# Patient Record
Sex: FEMALE | Race: BLACK OR AFRICAN AMERICAN | Employment: FULL TIME | URBAN - METROPOLITAN AREA
[De-identification: names, ages, dates, MRNs, and addresses within clinical notes are randomized per-mention and may not be internally consistent; named-entity substitution may affect disease eponyms.]

---

## 2017-06-19 ENCOUNTER — HOSPITAL ENCOUNTER (OUTPATIENT)
Dept: MAMMOGRAPHY | Age: 47
Discharge: HOME | End: 2017-06-19
Attending: NURSE PRACTITIONER

## 2017-06-19 DIAGNOSIS — Z12.31 VISIT FOR SCREENING MAMMOGRAM: ICD-10-CM

## 2017-06-19 PROCEDURE — 77067 SCR MAMMO BI INCL CAD: CPT

## 2018-03-22 ENCOUNTER — TELEPHONE (OUTPATIENT)
Dept: SLEEP MEDICINE | Age: 48
End: 2018-03-22

## 2018-03-22 DIAGNOSIS — G47.33 OSA (OBSTRUCTIVE SLEEP APNEA): Primary | ICD-10-CM

## 2018-03-22 NOTE — TELEPHONE ENCOUNTER
STUDY ORDER CONFIRMATION    Study Indication:   G47.50  Study: Split Night Polysomnogram - CPT 91830: First prtion of the study is diagnostic, CPAP, BiPAPor ASV (as warranted) is titrated if HOLLY meets criteria. Schedule for second night study if split night was not possible, or optimal pressure could not be determined during the intital study.   Study Instructions / additional orders:

## 2018-03-22 NOTE — TELEPHONE ENCOUNTER
STUDY ORDER CONFIRMATION  Abiel Serrano 1970      Type of Study Requested: Direct Referral for Study - Please arrange a sleep study consult only if sleep study is positive. Diagnostic polysomnogram - CPT 18421: Split Study if patient meets the criteria. Referring Physician: Italia Mcknight MD    Date of Study: 04/19/2018    Spoke with: Michelle Rader         Height: 5ft4  Weight: 260  (over 499 lb can only be done at [facility])  BMI: 44.6       Snoring      yes    Excessive Daytime Sleepiness   yes    Witnessed Apnea     yes    Hypertension      yes    Cardiovascular disease (CHF-Heart Failure)  no    COPD or Emphysema?    no  On Oxygen? lpm Day/Night   no    Restless Leg Symptoms-   Do you experience an uncomfortable sensation in your legs   especially at night?    no   Kicking?     no   Twitching?     no    Do you experience insomnia?   no  Sleep talking/walking?     no  Do you ever wake with a rapid heart rate?  no  Unusual movements in sleep (violent, flailing limbs? )no  Night Terrors?      no  Sleep Paralysis or Sleep Hallucinations:  (while waking from sleep or dream, you cannot move) no  Do you/have you had seizures?   no  Have you had a Stroke, CVA or TIA?   no       When? Do you take any medications for the following? Pain       no  Anxiety       no  Sleep       no               Have you been in hospital?    no   Has it been at least 72 hrs. since discharge? no   Discharge Date   (If not at least 72 hrs, cannot see pt. for study)    Are you currently on an antibiotic?   no  If yes, for what reason? Do you need anything from us for your employer? no    Are you a CDL, DOT or other Certified ? no     Have you had a sleep study before?   yes  If yes, when and where? [place]   Are you currently using CPAP?   no  If yes, what is the setting? Do you have any special needs?   Wheelchair      no  Help to and from the bathroom   no  Other?      no    (If yes to any special needs a care giver may need to come with the patient and stay the night.)    Will someone need to accompany you on the night of your study? (Primarily for parents of minors, patients with special needs, elderly, long distance travel). Other family,  may stay until study begins. \"We want to be sure to take the best care of you. Are there Cultural or Spiritual needs that we should be aware of or are there any concerns that I can address for you?        no    If yes, describe:      Attach Medication List    If yes to any  or special needs, discuss with the Lead Tech    Notes:     Study date: 4/19/2018  Time:9:30PM  Location:[facility]       Compiled by:  Jony Marshall     Date: 3/22/2018

## 2018-04-17 ENCOUNTER — HOSPITAL ENCOUNTER (OUTPATIENT)
Dept: SLEEP MEDICINE | Age: 48
Discharge: HOME | End: 2018-04-17
Attending: INTERNAL MEDICINE
Payer: SUBSIDIZED

## 2018-04-17 VITALS
WEIGHT: 261.1 LBS | OXYGEN SATURATION: 98 % | HEART RATE: 100 BPM | TEMPERATURE: 97.9 F | SYSTOLIC BLOOD PRESSURE: 128 MMHG | HEIGHT: 64 IN | BODY MASS INDEX: 44.57 KG/M2 | DIASTOLIC BLOOD PRESSURE: 82 MMHG

## 2018-04-17 DIAGNOSIS — G47.33 OSA (OBSTRUCTIVE SLEEP APNEA): ICD-10-CM

## 2018-04-17 PROCEDURE — 95811 POLYSOM 6/>YRS CPAP 4/> PARM: CPT | Performed by: INTERNAL MEDICINE

## 2018-04-18 ENCOUNTER — TELEPHONE (OUTPATIENT)
Dept: SLEEP MEDICINE | Age: 48
End: 2018-04-18

## 2018-04-20 ENCOUNTER — DOCUMENTATION ONLY (OUTPATIENT)
Dept: SLEEP MEDICINE | Age: 48
End: 2018-04-20

## 2019-02-18 ENCOUNTER — APPOINTMENT (OUTPATIENT)
Dept: GENERAL RADIOLOGY | Age: 49
End: 2019-02-18
Attending: EMERGENCY MEDICINE
Payer: SELF-PAY

## 2019-02-18 ENCOUNTER — HOSPITAL ENCOUNTER (OUTPATIENT)
Age: 49
Setting detail: OBSERVATION
Discharge: HOME | End: 2019-02-20
Attending: EMERGENCY MEDICINE | Admitting: HOSPITALIST
Payer: SELF-PAY

## 2019-02-18 DIAGNOSIS — J45.51 SEVERE PERSISTENT ASTHMA WITH ACUTE EXACERBATION: Primary | ICD-10-CM

## 2019-02-18 PROBLEM — J45.901 ASTHMA EXACERBATION: Status: ACTIVE | Noted: 2019-02-18

## 2019-02-18 LAB
BASOPHILS # BLD: 0 K/UL (ref 0–0.1)
BASOPHILS NFR BLD: 0 % (ref 0–1)
DIFFERENTIAL METHOD BLD: ABNORMAL
EOSINOPHIL # BLD: 0.1 K/UL (ref 0–0.4)
EOSINOPHIL NFR BLD: 2 % (ref 0–7)
ERYTHROCYTE [DISTWIDTH] IN BLOOD BY AUTOMATED COUNT: 14.5 % (ref 11.5–14.5)
GLUCOSE BLD STRIP.AUTO-MCNC: 302 MG/DL (ref 65–100)
GLUCOSE BLD STRIP.AUTO-MCNC: 459 MG/DL (ref 65–100)
HCT VFR BLD AUTO: 38 % (ref 35–47)
HGB BLD-MCNC: 12.2 G/DL (ref 11.5–16)
IMM GRANULOCYTES # BLD AUTO: 0 K/UL (ref 0–0.04)
IMM GRANULOCYTES NFR BLD AUTO: 1 % (ref 0–0.5)
LACTATE BLD-SCNC: 1.81 MMOL/L (ref 0.4–2)
LYMPHOCYTES # BLD: 2.3 K/UL (ref 0.8–3.5)
LYMPHOCYTES NFR BLD: 28 % (ref 12–49)
MCH RBC QN AUTO: 26.2 PG (ref 26–34)
MCHC RBC AUTO-ENTMCNC: 32.1 G/DL (ref 30–36.5)
MCV RBC AUTO: 81.5 FL (ref 80–99)
MONOCYTES # BLD: 0.6 K/UL (ref 0–1)
MONOCYTES NFR BLD: 7 % (ref 5–13)
NEUTS SEG # BLD: 5 K/UL (ref 1.8–8)
NEUTS SEG NFR BLD: 62 % (ref 32–75)
NRBC # BLD: 0 K/UL (ref 0–0.01)
NRBC BLD-RTO: 0 PER 100 WBC
PLATELET # BLD AUTO: 302 K/UL (ref 150–400)
PMV BLD AUTO: 10.8 FL (ref 8.9–12.9)
RBC # BLD AUTO: 4.66 M/UL (ref 3.8–5.2)
SERVICE CMNT-IMP: ABNORMAL
SERVICE CMNT-IMP: ABNORMAL
TROPONIN I BLD-MCNC: <0.04 NG/ML (ref 0–0.08)
WBC # BLD AUTO: 8.1 K/UL (ref 3.6–11)

## 2019-02-18 PROCEDURE — 83605 ASSAY OF LACTIC ACID: CPT

## 2019-02-18 PROCEDURE — 99285 EMERGENCY DEPT VISIT HI MDM: CPT

## 2019-02-18 PROCEDURE — 96376 TX/PRO/DX INJ SAME DRUG ADON: CPT

## 2019-02-18 PROCEDURE — 74011250636 HC RX REV CODE- 250/636: Performed by: HOSPITALIST

## 2019-02-18 PROCEDURE — 71046 X-RAY EXAM CHEST 2 VIEWS: CPT

## 2019-02-18 PROCEDURE — 94640 AIRWAY INHALATION TREATMENT: CPT

## 2019-02-18 PROCEDURE — 84484 ASSAY OF TROPONIN QUANT: CPT

## 2019-02-18 PROCEDURE — 96375 TX/PRO/DX INJ NEW DRUG ADDON: CPT

## 2019-02-18 PROCEDURE — 74011000250 HC RX REV CODE- 250: Performed by: EMERGENCY MEDICINE

## 2019-02-18 PROCEDURE — 74011000250 HC RX REV CODE- 250

## 2019-02-18 PROCEDURE — 99218 HC RM OBSERVATION: CPT

## 2019-02-18 PROCEDURE — 85025 COMPLETE CBC W/AUTO DIFF WBC: CPT

## 2019-02-18 PROCEDURE — 96361 HYDRATE IV INFUSION ADD-ON: CPT

## 2019-02-18 PROCEDURE — 93005 ELECTROCARDIOGRAM TRACING: CPT

## 2019-02-18 PROCEDURE — 36415 COLL VENOUS BLD VENIPUNCTURE: CPT

## 2019-02-18 PROCEDURE — 82962 GLUCOSE BLOOD TEST: CPT

## 2019-02-18 PROCEDURE — 96365 THER/PROPH/DIAG IV INF INIT: CPT

## 2019-02-18 PROCEDURE — 74011250636 HC RX REV CODE- 250/636: Performed by: EMERGENCY MEDICINE

## 2019-02-18 PROCEDURE — 96372 THER/PROPH/DIAG INJ SC/IM: CPT

## 2019-02-18 PROCEDURE — 74011250637 HC RX REV CODE- 250/637: Performed by: EMERGENCY MEDICINE

## 2019-02-18 PROCEDURE — 74011636637 HC RX REV CODE- 636/637: Performed by: HOSPITALIST

## 2019-02-18 RX ORDER — SODIUM CHLORIDE 0.9 % (FLUSH) 0.9 %
5-40 SYRINGE (ML) INJECTION EVERY 8 HOURS
Status: DISCONTINUED | OUTPATIENT
Start: 2019-02-18 | End: 2019-02-20 | Stop reason: HOSPADM

## 2019-02-18 RX ORDER — IPRATROPIUM BROMIDE AND ALBUTEROL SULFATE 2.5; .5 MG/3ML; MG/3ML
3 SOLUTION RESPIRATORY (INHALATION)
Status: COMPLETED | OUTPATIENT
Start: 2019-02-18 | End: 2019-02-18

## 2019-02-18 RX ORDER — KETOROLAC TROMETHAMINE 30 MG/ML
15 INJECTION, SOLUTION INTRAMUSCULAR; INTRAVENOUS
Status: COMPLETED | OUTPATIENT
Start: 2019-02-18 | End: 2019-02-18

## 2019-02-18 RX ORDER — KETOROLAC TROMETHAMINE 30 MG/ML
INJECTION, SOLUTION INTRAMUSCULAR; INTRAVENOUS
Status: DISPENSED
Start: 2019-02-18 | End: 2019-02-19

## 2019-02-18 RX ORDER — LEVOTHYROXINE SODIUM 25 UG/1
25 TABLET ORAL
Status: DISCONTINUED | OUTPATIENT
Start: 2019-02-19 | End: 2019-02-20 | Stop reason: HOSPADM

## 2019-02-18 RX ORDER — PANTOPRAZOLE SODIUM 40 MG/1
40 TABLET, DELAYED RELEASE ORAL DAILY
Status: DISCONTINUED | OUTPATIENT
Start: 2019-02-19 | End: 2019-02-20 | Stop reason: HOSPADM

## 2019-02-18 RX ORDER — METFORMIN HYDROCHLORIDE 500 MG/1
1000 TABLET ORAL 2 TIMES DAILY WITH MEALS
Status: DISCONTINUED | OUTPATIENT
Start: 2019-02-19 | End: 2019-02-20 | Stop reason: HOSPADM

## 2019-02-18 RX ORDER — FLUTICASONE FUROATE AND VILANTEROL 100; 25 UG/1; UG/1
1 POWDER RESPIRATORY (INHALATION) DAILY
Status: DISCONTINUED | OUTPATIENT
Start: 2019-02-19 | End: 2019-02-20 | Stop reason: HOSPADM

## 2019-02-18 RX ORDER — INSULIN LISPRO 100 [IU]/ML
INJECTION, SOLUTION INTRAVENOUS; SUBCUTANEOUS
Status: DISCONTINUED | OUTPATIENT
Start: 2019-02-18 | End: 2019-02-19

## 2019-02-18 RX ORDER — INSULIN GLARGINE 100 [IU]/ML
70 INJECTION, SOLUTION SUBCUTANEOUS
Status: DISCONTINUED | OUTPATIENT
Start: 2019-02-18 | End: 2019-02-20 | Stop reason: HOSPADM

## 2019-02-18 RX ORDER — ALBUTEROL SULFATE 1.25 MG/3ML
2.5 SOLUTION RESPIRATORY (INHALATION)
Qty: 25 EACH | Refills: 0 | Status: SHIPPED | OUTPATIENT
Start: 2019-02-18

## 2019-02-18 RX ORDER — MAGNESIUM SULFATE 100 %
4 CRYSTALS MISCELLANEOUS AS NEEDED
Status: DISCONTINUED | OUTPATIENT
Start: 2019-02-18 | End: 2019-02-20 | Stop reason: HOSPADM

## 2019-02-18 RX ORDER — ACETAMINOPHEN 325 MG/1
650 TABLET ORAL
Status: DISCONTINUED | OUTPATIENT
Start: 2019-02-18 | End: 2019-02-18

## 2019-02-18 RX ORDER — KETOROLAC TROMETHAMINE 30 MG/ML
15 INJECTION, SOLUTION INTRAMUSCULAR; INTRAVENOUS
Status: DISCONTINUED | OUTPATIENT
Start: 2019-02-18 | End: 2019-02-18

## 2019-02-18 RX ORDER — ENOXAPARIN SODIUM 100 MG/ML
40 INJECTION SUBCUTANEOUS EVERY 12 HOURS
Status: DISCONTINUED | OUTPATIENT
Start: 2019-02-18 | End: 2019-02-20 | Stop reason: HOSPADM

## 2019-02-18 RX ORDER — SODIUM CHLORIDE 0.9 % (FLUSH) 0.9 %
5-40 SYRINGE (ML) INJECTION AS NEEDED
Status: DISCONTINUED | OUTPATIENT
Start: 2019-02-18 | End: 2019-02-20 | Stop reason: HOSPADM

## 2019-02-18 RX ORDER — DEXTROSE 50 % IN WATER (D50W) INTRAVENOUS SYRINGE
12.5-25 AS NEEDED
Status: DISCONTINUED | OUTPATIENT
Start: 2019-02-18 | End: 2019-02-20 | Stop reason: HOSPADM

## 2019-02-18 RX ORDER — IPRATROPIUM BROMIDE AND ALBUTEROL SULFATE 2.5; .5 MG/3ML; MG/3ML
3 SOLUTION RESPIRATORY (INHALATION)
Status: DISCONTINUED | OUTPATIENT
Start: 2019-02-18 | End: 2019-02-20 | Stop reason: HOSPADM

## 2019-02-18 RX ORDER — INSULIN LISPRO 100 [IU]/ML
15 INJECTION, SOLUTION INTRAVENOUS; SUBCUTANEOUS ONCE
Status: COMPLETED | OUTPATIENT
Start: 2019-02-18 | End: 2019-02-18

## 2019-02-18 RX ORDER — MAGNESIUM SULFATE HEPTAHYDRATE 40 MG/ML
2 INJECTION, SOLUTION INTRAVENOUS
Status: COMPLETED | OUTPATIENT
Start: 2019-02-18 | End: 2019-02-18

## 2019-02-18 RX ORDER — PREDNISONE 50 MG/1
50 TABLET ORAL DAILY
Qty: 3 TAB | Refills: 0 | Status: SHIPPED | OUTPATIENT
Start: 2019-02-18 | End: 2019-02-20

## 2019-02-18 RX ORDER — METHOCARBAMOL 500 MG/1
500 TABLET, FILM COATED ORAL 4 TIMES DAILY
Qty: 20 TAB | Refills: 0 | Status: SHIPPED | OUTPATIENT
Start: 2019-02-18 | End: 2020-03-17

## 2019-02-18 RX ORDER — IPRATROPIUM BROMIDE AND ALBUTEROL SULFATE 2.5; .5 MG/3ML; MG/3ML
SOLUTION RESPIRATORY (INHALATION)
Status: COMPLETED
Start: 2019-02-18 | End: 2019-02-18

## 2019-02-18 RX ORDER — NAPROXEN 500 MG/1
500 TABLET ORAL
Qty: 20 TAB | Refills: 0 | Status: SHIPPED | OUTPATIENT
Start: 2019-02-18 | End: 2020-07-27

## 2019-02-18 RX ORDER — ACETAMINOPHEN 325 MG/1
650 TABLET ORAL
Status: DISCONTINUED | OUTPATIENT
Start: 2019-02-18 | End: 2019-02-20 | Stop reason: HOSPADM

## 2019-02-18 RX ADMIN — METHYLPREDNISOLONE SODIUM SUCCINATE 125 MG: 125 INJECTION, POWDER, FOR SOLUTION INTRAMUSCULAR; INTRAVENOUS at 17:28

## 2019-02-18 RX ADMIN — IPRATROPIUM BROMIDE AND ALBUTEROL SULFATE 3 ML: .5; 2.5 SOLUTION RESPIRATORY (INHALATION) at 18:19

## 2019-02-18 RX ADMIN — ENOXAPARIN SODIUM 40 MG: 40 INJECTION SUBCUTANEOUS at 22:47

## 2019-02-18 RX ADMIN — KETOROLAC TROMETHAMINE 15 MG: 30 INJECTION, SOLUTION INTRAMUSCULAR at 18:36

## 2019-02-18 RX ADMIN — IPRATROPIUM BROMIDE AND ALBUTEROL SULFATE 3 ML: 2.5; .5 SOLUTION RESPIRATORY (INHALATION) at 18:19

## 2019-02-18 RX ADMIN — ACETAMINOPHEN 650 MG: 325 TABLET ORAL at 20:26

## 2019-02-18 RX ADMIN — INSULIN LISPRO 15 UNITS: 100 INJECTION, SOLUTION INTRAVENOUS; SUBCUTANEOUS at 22:48

## 2019-02-18 RX ADMIN — Medication 10 ML: at 22:51

## 2019-02-18 RX ADMIN — MAGNESIUM SULFATE HEPTAHYDRATE 2 G: 40 INJECTION, SOLUTION INTRAVENOUS at 18:55

## 2019-02-18 RX ADMIN — IPRATROPIUM BROMIDE AND ALBUTEROL SULFATE 3 ML: .5; 3 SOLUTION RESPIRATORY (INHALATION) at 17:28

## 2019-02-18 RX ADMIN — INSULIN LISPRO 15 UNITS: 100 INJECTION, SOLUTION INTRAVENOUS; SUBCUTANEOUS at 22:50

## 2019-02-18 RX ADMIN — METHYLPREDNISOLONE SODIUM SUCCINATE 40 MG: 40 INJECTION, POWDER, FOR SOLUTION INTRAMUSCULAR; INTRAVENOUS at 22:48

## 2019-02-18 RX ADMIN — INSULIN GLARGINE 70 UNITS: 100 INJECTION, SOLUTION SUBCUTANEOUS at 22:50

## 2019-02-18 NOTE — DISCHARGE INSTRUCTIONS

## 2019-02-18 NOTE — ED PROVIDER NOTES
EMERGENCY DEPARTMENT HISTORY AND PHYSICAL EXAM 
 
 
Date: 2/18/2019 Patient Name: Leta Hamilton History of Presenting Illness Chief Complaint Patient presents with  Chest Pain Patient complain of intermittent chest pain since Friday History Provided By: Patient HPI: Leta Hamilton, 50 y.o. female with PMHx significant for asthma, presents ambulatory to the ED with cc of moderate, recurrent R sided CP radiating to L side chest and L arm since yesterday with associated wheezing and SOB. Pt reports symptoms initially presented ~ 4 days ago; however it resolved, representing itself yesterday morning. She also mentions a cough with minimal sputum. Pt reports treating symptoms with albuterol, but denies any improvement in symptoms. Pt denies any modifying factors. He specifically denies any fevers, chills, nausea, vomiting,  headache, rash, diarrhea, sweating or weight loss. There are no other complaints, changes, or physical findings at this time. PCP: Lela Ordaz NP No current facility-administered medications on file prior to encounter. Current Outpatient Medications on File Prior to Encounter Medication Sig Dispense Refill  hydrocodone-acetaminophen (NORCO) 5-325 mg per tablet Take 1 tablet by mouth every four (4) hours as needed for Pain. 15 tablet 0  
 metFORMIN (GLUCOPHAGE) 500 mg tablet Take 1,000 mg by mouth two (2) times daily (with meals).  esomeprazole (NEXIUM) 40 mg capsule Take 40 mg by mouth daily.  glipiZIDE (GLUCOTROL) 5 mg tablet Take 5 mg by mouth two (2) times a day.  levothyroxine (SYNTHROID) 25 mcg tablet Take 25 mcg by mouth Daily (before breakfast).  amitriptyline (ELAVIL) 25 mg tablet Take 25 mg by mouth nightly.  fluticasone-salmeterol (ADVAIR DISKUS) 250-50 mcg/dose diskus inhaler Take 1 Puff by inhalation two (2) times a day.     
 albuterol (VENTOLIN HFA) 90 mcg/actuation inhaler Take 2 Puffs by inhalation every four (4) hours as needed for Wheezing. Past History Past Medical History: 
Past Medical History:  
Diagnosis Date  Asthma   
 followed by PCP  Diabetes ([facility] [address]  Hypothyroid  Unspecified sleep apnea CPAP Past Surgical History: 
Past Surgical History:  
Procedure Laterality Date  HX BREAST REDUCTION Bilateral yrs ago  HX CHOLECYSTECTOMY Family History: No family history on file. Social History: 
Social History Tobacco Use  Smoking status: Never Smoker Substance Use Topics  Alcohol use: No  
 Drug use: No  
 
 
Allergies: 
No Known Allergies Review of Systems Review of Systems Constitutional: Negative for chills and fever. Respiratory: Positive for cough, shortness of breath and wheezing. Cardiovascular: Positive for chest pain. Gastrointestinal: Negative for constipation, diarrhea, nausea and vomiting. Musculoskeletal: Positive for myalgias. Neurological: Positive for headaches. Negative for weakness and numbness. All other systems reviewed and are negative. Physical Exam  
Physical Exam  
Constitutional: She is oriented to person, place, and time. She appears well-developed and well-nourished. HENT:  
Head: Normocephalic and atraumatic. Eyes: Conjunctivae and EOM are normal.  
Neck: Normal range of motion. Neck supple. Cardiovascular: Normal rate and regular rhythm. Pulmonary/Chest: Effort normal. No respiratory distress. She has wheezes (diffusely ). Sat well on the monitor Abdominal: Soft. She exhibits no distension. There is no tenderness. Musculoskeletal: Normal range of motion. Neurological: She is alert and oriented to person, place, and time. Skin: Skin is warm and dry. Psychiatric: Her mood appears anxious. Nursing note and vitals reviewed. Diagnostic Study Results Labs - Recent Results (from the past 12 hour(s)) EKG, 12 LEAD, INITIAL Collection Time: 02/18/19  3:41 PM  
Result Value Ref Range Ventricular Rate 87 BPM  
 Atrial Rate 87 BPM  
 P-R Interval 136 ms QRS Duration 82 ms Q-T Interval 364 ms QTC Calculation (Bezet) 438 ms Calculated P Axis 20 degrees Calculated R Axis -2 degrees Calculated T Axis 5 degrees Diagnosis Normal sinus rhythm Normal ECG When compared with ECG of 09-SEP-2014 13:12, 
Nonspecific T wave abnormality now evident in Anterior leads CBC WITH AUTOMATED DIFF Collection Time: 02/18/19  4:02 PM  
Result Value Ref Range WBC 8.1 3.6 - 11.0 K/uL  
 RBC 4.66 3.80 - 5.20 M/uL  
 HGB 12.2 11.5 - 16.0 g/dL HCT 38.0 35.0 - 47.0 % MCV 81.5 80.0 - 99.0 FL  
 MCH 26.2 26.0 - 34.0 PG  
 MCHC 32.1 30.0 - 36.5 g/dL  
 RDW 14.5 11.5 - 14.5 % PLATELET 367 415 - 133 K/uL MPV 10.8 8.9 - 12.9 FL  
 NRBC 0.0 0  WBC ABSOLUTE NRBC 0.00 0.00 - 0.01 K/uL NEUTROPHILS 62 32 - 75 % LYMPHOCYTES 28 12 - 49 % MONOCYTES 7 5 - 13 % EOSINOPHILS 2 0 - 7 % BASOPHILS 0 0 - 1 % IMMATURE GRANULOCYTES 1 (H) 0.0 - 0.5 % ABS. NEUTROPHILS 5.0 1.8 - 8.0 K/UL  
 ABS. LYMPHOCYTES 2.3 0.8 - 3.5 K/UL  
 ABS. MONOCYTES 0.6 0.0 - 1.0 K/UL  
 ABS. EOSINOPHILS 0.1 0.0 - 0.4 K/UL  
 ABS. BASOPHILS 0.0 0.0 - 0.1 K/UL  
 ABS. IMM. GRANS. 0.0 0.00 - 0.04 K/UL  
 DF AUTOMATED    
GLUCOSE, POC Collection Time: 02/18/19  5:36 PM  
Result Value Ref Range Glucose (POC) 302 (H) 65 - 100 mg/dL Performed by Nael Browning POC LACTIC ACID Collection Time: 02/18/19  5:39 PM  
Result Value Ref Range Lactic Acid (POC) 1.81 0.40 - 2.00 mmol/L  
POC TROPONIN-I Collection Time: 02/18/19  5:45 PM  
Result Value Ref Range Troponin-I (POC) <0.04 0.00 - 0.08 ng/mL Radiologic Studies - CXR Results  (Last 48 hours) 02/18/19 1611  XR CHEST PA LAT Final result Impression:  IMPRESSION: Normal chest.  
  
 Narrative:  EXAM: XR CHEST PA LAT INDICATION: chest pain COMPARISON: 9/9/2014. FINDINGS: PA and lateral radiographs of the chest demonstrate clear lungs. The  
cardiac and mediastinal contours and pulmonary vascularity are normal. The bones  
and soft tissues are within normal limits. Medical Decision Making I am the first provider for this patient. I reviewed the vital signs, available nursing notes, past medical history, past surgical history, family history and social history. Vital Signs-Reviewed the patient's vital signs. Patient Vitals for the past 12 hrs: 
 Temp Pulse Resp BP SpO2  
02/18/19 1521 98 °F (36.7 °C) 98 16 145/77 100 % Pulse Oximetry Analysis - 100% on RA 
 
EKG interpretation: (Preliminary) 15:51 Rhythm: normal sinus rhythm; and regular . Rate (approx.): 87; Axis: normal; P wave: normal; QRS interval: normal ; ST/T wave: normal; Other findings: No other findings. Records Reviewed: Nursing Notes, Old Medical Records, Previous Radiology Studies and Previous Laboratory Studies Provider Notes (Medical Decision Making):  
Patient presents with SOB and wheezing. DDx: asthma, copd, pulmonary edema, acute bronchitis, ACS, pna. Will treat with nebs and solumedrol PRN and obtain labs, EKG and CXR PRN 
 
ED Course:  
Initial assessment performed. The patients presenting problems have been discussed, and they are in agreement with the care plan formulated and outlined with them. I have encouraged them to ask questions as they arise throughout their visit. ED Course as of Feb 18 1934 Mon Feb 18, 2019  
Natali Velásquez 1 Reassessed pt, she still sounds tight but does have a more pronounced wheeze. Will give another albuterol and steroids. [CW] 1932 Pt states she does not feel any better with minor HA. On physical exam, pt is still tachypneic and wheezy. [CW] 1934 CONSULT NOTE:  
7:34 PM 
Carol Hu MD spoke with Dr. Violette Kehr, Specialty: Hospitalist 
 Discussed pt's hx, disposition, and available diagnostic and imaging results. Reviewed care plans. Consultant will evaluate pt for admission. Written by Alvaro Mortensen, ED Scribe, as dictated by Andres Tran MD. 
 
  [CW] ED Course User Index 
[CW] Reji Job' D  
 
 
Critical Care Time: CRITICAL CARE NOTE : 
 
6:19 PM 
 
 
IMPENDING DETERIORATION -Airway and Respiratory ASSOCIATED RISK FACTORS - Hypoxia MANAGEMENT- Bedside Assessment and Supervision of Care INTERPRETATION -  Xrays, ECG, Blood Pressure and Cardiac Output Measures INTERVENTIONS - vent mngmt CASE REVIEW - Hospitalist and Nursing TREATMENT RESPONSE -Stable PERFORMED BY - Self NOTES   : 
 
 
I have spent 40 minutes of critical care time involved in lab review, consultations with specialist, family decision- making, bedside attention and documentation. During this entire length of time I was immediately available to the patient . Andres Tran MD 
 
Disposition: 
Admit Note: 
8:46 PM 
Pt is being admitted by Dr. Hadley Davison. The results of their tests and reason(s) for their admission have been discussed with pt and/or available family. They convey agreement and understanding for the need to be admitted and for admission diagnosis. PLAN: 
1. Admit to hospitalist 
 
 
Diagnosis Clinical Impression: 1. Moderate persistent asthma with acute exacerbation Attestations: This note is prepared by Alvaro Mortensen, acting as Scribe for Andres Tran MD. Andres Tran MD: The scribe's documentation has been prepared under my direction and personally reviewed by me in its entirety. I confirm that the note above accurately reflects all work, treatment, procedures, and medical decision making performed by me

## 2019-02-19 LAB
ANION GAP SERPL CALC-SCNC: 8 MMOL/L (ref 5–15)
BUN SERPL-MCNC: 15 MG/DL (ref 6–20)
BUN/CREAT SERPL: 14 (ref 12–20)
CALCIUM SERPL-MCNC: 8.2 MG/DL (ref 8.5–10.1)
CHLORIDE SERPL-SCNC: 98 MMOL/L (ref 97–108)
CO2 SERPL-SCNC: 26 MMOL/L (ref 21–32)
CREAT SERPL-MCNC: 1.08 MG/DL (ref 0.55–1.02)
GLUCOSE BLD STRIP.AUTO-MCNC: 383 MG/DL (ref 65–100)
GLUCOSE BLD STRIP.AUTO-MCNC: 404 MG/DL (ref 65–100)
GLUCOSE BLD STRIP.AUTO-MCNC: 410 MG/DL (ref 65–100)
GLUCOSE BLD STRIP.AUTO-MCNC: 412 MG/DL (ref 65–100)
GLUCOSE BLD STRIP.AUTO-MCNC: 433 MG/DL (ref 65–100)
GLUCOSE SERPL-MCNC: 425 MG/DL (ref 65–100)
POTASSIUM SERPL-SCNC: 4.3 MMOL/L (ref 3.5–5.1)
SERVICE CMNT-IMP: ABNORMAL
SODIUM SERPL-SCNC: 132 MMOL/L (ref 136–145)

## 2019-02-19 PROCEDURE — 77030029684 HC NEB SM VOL KT MONA -A

## 2019-02-19 PROCEDURE — 74011250637 HC RX REV CODE- 250/637: Performed by: INTERNAL MEDICINE

## 2019-02-19 PROCEDURE — 96372 THER/PROPH/DIAG INJ SC/IM: CPT

## 2019-02-19 PROCEDURE — 74011636637 HC RX REV CODE- 636/637: Performed by: INTERNAL MEDICINE

## 2019-02-19 PROCEDURE — 96361 HYDRATE IV INFUSION ADD-ON: CPT

## 2019-02-19 PROCEDURE — 74011636637 HC RX REV CODE- 636/637: Performed by: HOSPITALIST

## 2019-02-19 PROCEDURE — 74011250637 HC RX REV CODE- 250/637: Performed by: HOSPITALIST

## 2019-02-19 PROCEDURE — 74011250636 HC RX REV CODE- 250/636: Performed by: INTERNAL MEDICINE

## 2019-02-19 PROCEDURE — 36415 COLL VENOUS BLD VENIPUNCTURE: CPT

## 2019-02-19 PROCEDURE — 94640 AIRWAY INHALATION TREATMENT: CPT

## 2019-02-19 PROCEDURE — 74011000250 HC RX REV CODE- 250: Performed by: HOSPITALIST

## 2019-02-19 PROCEDURE — 82962 GLUCOSE BLOOD TEST: CPT

## 2019-02-19 PROCEDURE — 96376 TX/PRO/DX INJ SAME DRUG ADON: CPT

## 2019-02-19 PROCEDURE — 80048 BASIC METABOLIC PNL TOTAL CA: CPT

## 2019-02-19 PROCEDURE — 99218 HC RM OBSERVATION: CPT

## 2019-02-19 PROCEDURE — 74011250636 HC RX REV CODE- 250/636: Performed by: HOSPITALIST

## 2019-02-19 RX ORDER — INSULIN LISPRO 100 [IU]/ML
10 INJECTION, SOLUTION INTRAVENOUS; SUBCUTANEOUS ONCE
Status: COMPLETED | OUTPATIENT
Start: 2019-02-19 | End: 2019-02-19

## 2019-02-19 RX ORDER — BUTALBITAL, ACETAMINOPHEN AND CAFFEINE 50; 325; 40 MG/1; MG/1; MG/1
1 TABLET ORAL
Status: DISCONTINUED | OUTPATIENT
Start: 2019-02-19 | End: 2019-02-20 | Stop reason: HOSPADM

## 2019-02-19 RX ORDER — INSULIN LISPRO 100 [IU]/ML
22 INJECTION, SOLUTION INTRAVENOUS; SUBCUTANEOUS ONCE
Status: COMPLETED | OUTPATIENT
Start: 2019-02-19 | End: 2019-02-19

## 2019-02-19 RX ORDER — PREDNISONE 20 MG/1
40 TABLET ORAL
Status: DISCONTINUED | OUTPATIENT
Start: 2019-02-20 | End: 2019-02-20 | Stop reason: HOSPADM

## 2019-02-19 RX ORDER — SODIUM CHLORIDE 9 MG/ML
150 INJECTION, SOLUTION INTRAVENOUS CONTINUOUS
Status: DISCONTINUED | OUTPATIENT
Start: 2019-02-19 | End: 2019-02-20 | Stop reason: HOSPADM

## 2019-02-19 RX ORDER — INSULIN LISPRO 100 [IU]/ML
15 INJECTION, SOLUTION INTRAVENOUS; SUBCUTANEOUS
Status: DISCONTINUED | OUTPATIENT
Start: 2019-02-19 | End: 2019-02-20 | Stop reason: HOSPADM

## 2019-02-19 RX ORDER — OXYCODONE AND ACETAMINOPHEN 5; 325 MG/1; MG/1
1 TABLET ORAL
Status: DISCONTINUED | OUTPATIENT
Start: 2019-02-19 | End: 2019-02-20 | Stop reason: HOSPADM

## 2019-02-19 RX ORDER — INSULIN LISPRO 100 [IU]/ML
20 INJECTION, SOLUTION INTRAVENOUS; SUBCUTANEOUS ONCE
Status: COMPLETED | OUTPATIENT
Start: 2019-02-19 | End: 2019-02-19

## 2019-02-19 RX ORDER — OXYCODONE AND ACETAMINOPHEN 10; 325 MG/1; MG/1
1 TABLET ORAL
Status: DISCONTINUED | OUTPATIENT
Start: 2019-02-19 | End: 2019-02-20 | Stop reason: HOSPADM

## 2019-02-19 RX ORDER — INSULIN LISPRO 100 [IU]/ML
18 INJECTION, SOLUTION INTRAVENOUS; SUBCUTANEOUS ONCE
Status: COMPLETED | OUTPATIENT
Start: 2019-02-19 | End: 2019-02-19

## 2019-02-19 RX ORDER — INSULIN LISPRO 100 [IU]/ML
INJECTION, SOLUTION INTRAVENOUS; SUBCUTANEOUS
Status: DISCONTINUED | OUTPATIENT
Start: 2019-02-19 | End: 2019-02-20 | Stop reason: HOSPADM

## 2019-02-19 RX ADMIN — IPRATROPIUM BROMIDE AND ALBUTEROL SULFATE 3 ML: .5; 3 SOLUTION RESPIRATORY (INHALATION) at 19:22

## 2019-02-19 RX ADMIN — Medication 5 ML: at 21:48

## 2019-02-19 RX ADMIN — Medication 10 ML: at 17:06

## 2019-02-19 RX ADMIN — METFORMIN HYDROCHLORIDE 1000 MG: 500 TABLET ORAL at 08:14

## 2019-02-19 RX ADMIN — IPRATROPIUM BROMIDE AND ALBUTEROL SULFATE 3 ML: .5; 3 SOLUTION RESPIRATORY (INHALATION) at 05:35

## 2019-02-19 RX ADMIN — METHYLPREDNISOLONE SODIUM SUCCINATE 40 MG: 40 INJECTION, POWDER, FOR SOLUTION INTRAMUSCULAR; INTRAVENOUS at 06:07

## 2019-02-19 RX ADMIN — ACETAMINOPHEN 650 MG: 325 TABLET ORAL at 01:10

## 2019-02-19 RX ADMIN — METFORMIN HYDROCHLORIDE 1000 MG: 500 TABLET ORAL at 17:07

## 2019-02-19 RX ADMIN — ENOXAPARIN SODIUM 40 MG: 40 INJECTION SUBCUTANEOUS at 08:14

## 2019-02-19 RX ADMIN — INSULIN GLARGINE 70 UNITS: 100 INJECTION, SOLUTION SUBCUTANEOUS at 21:45

## 2019-02-19 RX ADMIN — Medication 10 ML: at 06:07

## 2019-02-19 RX ADMIN — INSULIN LISPRO 18 UNITS: 100 INJECTION, SOLUTION INTRAVENOUS; SUBCUTANEOUS at 08:12

## 2019-02-19 RX ADMIN — OXYCODONE AND ACETAMINOPHEN 1 TABLET: 5; 325 TABLET ORAL at 21:43

## 2019-02-19 RX ADMIN — ENOXAPARIN SODIUM 40 MG: 40 INJECTION SUBCUTANEOUS at 21:44

## 2019-02-19 RX ADMIN — IPRATROPIUM BROMIDE AND ALBUTEROL SULFATE 3 ML: .5; 3 SOLUTION RESPIRATORY (INHALATION) at 11:40

## 2019-02-19 RX ADMIN — IPRATROPIUM BROMIDE AND ALBUTEROL SULFATE 3 ML: .5; 3 SOLUTION RESPIRATORY (INHALATION) at 23:26

## 2019-02-19 RX ADMIN — INSULIN LISPRO 10 UNITS: 100 INJECTION, SOLUTION INTRAVENOUS; SUBCUTANEOUS at 21:47

## 2019-02-19 RX ADMIN — FLUTICASONE FUROATE AND VILANTEROL TRIFENATATE 1 PUFF: 100; 25 POWDER RESPIRATORY (INHALATION) at 09:51

## 2019-02-19 RX ADMIN — ACETAMINOPHEN 650 MG: 325 TABLET ORAL at 07:26

## 2019-02-19 RX ADMIN — PANTOPRAZOLE SODIUM 40 MG: 40 TABLET, DELAYED RELEASE ORAL at 08:14

## 2019-02-19 RX ADMIN — LEVOTHYROXINE SODIUM 25 MCG: 25 TABLET ORAL at 07:26

## 2019-02-19 RX ADMIN — SODIUM CHLORIDE 150 ML/HR: 900 INJECTION, SOLUTION INTRAVENOUS at 18:04

## 2019-02-19 RX ADMIN — INSULIN LISPRO 20 UNITS: 100 INJECTION, SOLUTION INTRAVENOUS; SUBCUTANEOUS at 12:32

## 2019-02-19 RX ADMIN — INSULIN LISPRO 22 UNITS: 100 INJECTION, SOLUTION INTRAVENOUS; SUBCUTANEOUS at 17:05

## 2019-02-19 RX ADMIN — SODIUM CHLORIDE 250 ML: 900 INJECTION, SOLUTION INTRAVENOUS at 01:09

## 2019-02-19 RX ADMIN — OXYCODONE AND ACETAMINOPHEN 1 TABLET: 5; 325 TABLET ORAL at 09:51

## 2019-02-19 RX ADMIN — INSULIN LISPRO 15 UNITS: 100 INJECTION, SOLUTION INTRAVENOUS; SUBCUTANEOUS at 18:45

## 2019-02-19 RX ADMIN — IPRATROPIUM BROMIDE AND ALBUTEROL SULFATE 3 ML: .5; 3 SOLUTION RESPIRATORY (INHALATION) at 16:01

## 2019-02-19 NOTE — PROGRESS NOTES
Hospitalist Service Progress Note Daily Progress Note: 2019 Assessment/Plan:  
Mild persistent asthma with exacerbation IMPROVING  
-med/obs Switch to oral steroids, duo nebs 
-peak flow 
-cont home steroid inhaler (substitution by pharmacy per protocol) 
-monitor sats 
  
HTN 
-resume PTA home meds  Fair control  
  
DM, steroid induced Hyperglycemia Required high does of Insulin ,switched to oral prednisone hopefuly that helps to control hyperglycemia , started on 10 units of lispiro before meal  
-resume lantus, SSI, metformin 
-POC  
-IVF  ML/H 
  
Hypothyroidism 
-resume levothyroxine 
  
HOLLY 
-QHS CPAP Subjective: FEELS BETTER Review of Systems:  
 
 
Objective:  
Physical examination Visit Vitals /88 (BP 1 Location: Right arm) Pulse 83 Temp 97.6 °F (36.4 °C) Resp 18 Ht 5' 4\" (1.626 m) Wt 118.4 kg (261 lb 0.4 oz) SpO2 96% BMI 44.80 kg/m² Temp (24hrs), Av.1 °F (36.7 °C), Min:97.6 °F (36.4 °C), Max:98.9 °F (37.2 °C) O2 Device: Room air Patient Vitals for the past 24 hrs: 
 Temp Pulse Resp BP SpO2  
19 1551 97.6 °F (36.4 °C) 83 18 153/88 96 % 19 0738 98.9 °F (37.2 °C) 80 20 141/81 96 % 19 0533     97 % 19 2358 97.7 °F (36.5 °C) 80 17 119/66 97 % 19 2122 98.2 °F (36.8 °C) 93 16 126/79 96 % 19 1900  (!) 102 15 137/74 97 % 19 1830  99 17 124/73 97 % 19 1800  89 11 119/59 98 % Intake/Output Summary (Last 24 hours) at 2019 1736 Last data filed at 2019 2656 Gross per 24 hour Intake 660 ml Output  Net 660 ml Last shift: 
   0701 -  190 In: 5 [P.O.:420] Out: - Last 3 shifts: 
  1901 -  0700 In: 240 [P.O.:240] Out: - General:   Alert, cooperative, no acute distress Head:   Atraumatic Eyes:   Conjunctivae clear ENT:  Oral mucosa normal  
Neck:  Supple, trachea midline, no adenopathy No JVD Back:    No CVA tenderness Chest wall:    No tenderness or deformities Lungs:   Clear to auscultation bilaterally Heart:   Regular rhythm, no murmur Abdomen:    Soft, non-tender No masses or organomegaly Extremities:  No edema or DVT signs Pulses:  Symmetric all extremities Skin:  Warm and dry No rashes or lesions Neurologic:  Oriented No focal deficits Psychiatric:   
   
 
Data Review:  
 
 
Recent Labs  
  02/18/19 
1602 WBC 8.1 HGB 12.2 HCT 38.0  
 Recent Labs  
  02/19/19 
0419 * K 4.3 CL 98  
CO2 26 * BUN 15  
CREA 1.08* CA 8.2* No results for input(s): PH, PCO2, PO2, HCO3, FIO2 in the last 72 hours. Lab Results Component Value Date/Time Glucose 425 (H) 02/19/2019 04:19 AM  
  
 
All Micro Results None No results found for: SDES No results found for: CULT Medications reviewed Current Facility-Administered Medications Medication Dose Route Frequency  insulin lispro (HUMALOG) injection   SubCUTAneous AC&HS  
 oxyCODONE-acetaminophen (PERCOCET) 5-325 mg per tablet 1 Tab  1 Tab Oral Q4H PRN  
 [START ON 2/20/2019] predniSONE (DELTASONE) tablet 40 mg  40 mg Oral DAILY WITH BREAKFAST  0.9% sodium chloride infusion  150 mL/hr IntraVENous CONTINUOUS  
 insulin lispro (HUMALOG) injection 15 Units  15 Units SubCUTAneous TIDAC  pantoprazole (PROTONIX) tablet 40 mg  40 mg Oral DAILY  fluticasone-vilanterol (BREO ELLIPTA) 100mcg-25mcg/puff  1 Puff Inhalation DAILY  levothyroxine (SYNTHROID) tablet 25 mcg  25 mcg Oral ACB  metFORMIN (GLUCOPHAGE) tablet 1,000 mg  1,000 mg Oral BID WITH MEALS  insulin glargine (LANTUS) injection 70 Units  70 Units SubCUTAneous QHS  sodium chloride (NS) flush 5-40 mL  5-40 mL IntraVENous Q8H  
 sodium chloride (NS) flush 5-40 mL  5-40 mL IntraVENous PRN  
 acetaminophen (TYLENOL) tablet 650 mg  650 mg Oral Q4H PRN  
  enoxaparin (LOVENOX) injection 40 mg  40 mg SubCUTAneous Q12H  
 glucose chewable tablet 16 g  4 Tab Oral PRN  
 dextrose (D50W) injection syrg 12.5-25 g  12.5-25 g IntraVENous PRN  
 glucagon (GLUCAGEN) injection 1 mg  1 mg IntraMUSCular PRN  
 albuterol-ipratropium (DUO-NEB) 2.5 MG-0.5 MG/3 ML  3 mL Nebulization Q4H PRN Signed: 
 Nazario Toledo MD 
 Internist / Hospitalist

## 2019-02-19 NOTE — PROGRESS NOTES
Patient BG is 471. Paged Hugh Wayne. Received order to administer 18 Units of Humalog 
 
1200 . Received order to adm 20 U of Humalog 1630 . Received order to adm 22 U of Humalog

## 2019-02-19 NOTE — PROGRESS NOTES
Bedside and Verbal shift change report given to Maria E HERNANDEZ (oncoming nurse) by Viv Richmond (offgoing nurse). Report included the following information SBAR, Kardex, Intake/Output, MAR and Recent Results.

## 2019-02-19 NOTE — PROGRESS NOTES
Primary Nurse Naomy Nash RN and Lela Albarado RN performed a dual skin assessment on this patient No impairment noted Yobani score is 23

## 2019-02-19 NOTE — H&P
Hospitalist Admission NoteNAME: Hira Rosen :  1970 MRN:  [id number] Date/Time:  2019 8:24 PM 
 
Patient PCP: Dani Fleischer, NP 
______________________________________________________________________ Given the patient's current clinical presentation, I have a high level of concern for decompensation if discharged from the emergency department. Complex decision making was performed, which includes reviewing the patient's available past medical records, laboratory results, and x-ray films. My assessment of this patient's clinical condition and my plan of care is as follows. Assessment / Plan: 
 
Mild persistent asthma with exacerbation 
-med/obs 
-IV steroids, duo nebs 
-peak flow 
-cont home steroid inhaler (substitution by pharmacy per protocol) 
-monitor sats HTN 
-resume PTA home meds DM 
-resume lantus, SSI, metformin 
-POC Hypothyroidism 
-resume levothyroxine HOLLY 
-QHS CPAP Code Status: full Surrogate Decision Maker: daughter and  DVT Prophylaxis: lovenox GI Prophylaxis: not indicated Baseline: independent Subjective: CHIEF COMPLAINT: SOB and chest tightness HISTORY OF PRESENT ILLNESS:    
Sathya Gale is a 50 y.o. female with PMHx significant for asthma, HTN, DM, obesity presented to ER with above complaints. Pt says she has been having symptoms for 4 days but since yesterday they have worsened with chest tightness, wheezing and progressive SOB. She is a bis  and says when she is exposed to some odors in bus, her symptoms are worse. She tried her albuterol at home with no benefit. Denies recent sickness,, travel or sick exposure. In ER she is afebrile, normal labs, sats >95% on RA. cxr neg. She was noticed to have decreased air entry and wheezing, multiple neb treatment given with minimal benefit. We were asked to admit for work up and evaluation of the above problems. Past Medical History: Diagnosis Date  Asthma   
 followed by PCP  Diabetes ([facility] [address]  Hypothyroid  Unspecified sleep apnea CPAP Past Surgical History:  
Procedure Laterality Date  HX BREAST REDUCTION Bilateral yrs ago  HX CHOLECYSTECTOMY Social History Tobacco Use  Smoking status: Never Smoker Substance Use Topics  Alcohol use: No  
  
 
Family history: +dm No Known Allergies Prior to Admission medications Medication Sig Start Date End Date Taking? Authorizing Provider  
predniSONE (DELTASONE) 50 mg tablet Take 1 Tab by mouth daily for 3 days. 2/18/19 2/21/19 Yes Andrea Fuchs MD  
albuterol (ACCUNEB) 1.25 mg/3 mL nebu Take 6 mL by inhalation every four (4) hours as needed (wheezing). 2/18/19  Yes Andrea Fuchs MD  
methocarbamol (ROBAXIN) 500 mg tablet Take 1 Tab by mouth four (4) times daily. 2/18/19  Yes Andrea Fuchs MD  
naproxen (NAPROSYN) 500 mg tablet Take 1 Tab by mouth every twelve (12) hours as needed for Pain. 2/18/19  Yes Andrea Fuchs MD  
hydrocodone-acetaminophen [facility]) 5-325 mg per tablet Take 1 tablet by mouth every four (4) hours as needed for Pain. 9/9/14   Yoel Mills, PA  
metFORMIN (GLUCOPHAGE) 500 mg tablet Take 1,000 mg by mouth two (2) times daily (with meals). Provider, Historical  
esomeprazole (NEXIUM) 40 mg capsule Take 40 mg by mouth daily. Provider, Historical  
glipiZIDE (GLUCOTROL) 5 mg tablet Take 5 mg by mouth two (2) times a day. Provider, Historical  
levothyroxine (SYNTHROID) 25 mcg tablet Take 25 mcg by mouth Daily (before breakfast). Provider, Historical  
amitriptyline (ELAVIL) 25 mg tablet Take 25 mg by mouth nightly. Provider, Historical  
fluticasone-salmeterol (ADVAIR DISKUS) 250-50 mcg/dose diskus inhaler Take 1 Puff by inhalation two (2) times a day.     Provider, Historical  
albuterol (VENTOLIN HFA) 90 mcg/actuation inhaler Take 2 Puffs by inhalation every four (4) hours as needed for Wheezing. Provider, Historical  
 
 
REVIEW OF SYSTEMS:    
I am not able to complete the review of systems because: The patient is intubated and sedated The patient has altered mental status due to his acute medical problems The patient has baseline aphasia from prior stroke(s) The patient has baseline dementia and is not reliable historian The patient is in acute medical distress and unable to provide information Total of 12 systems reviewed as follows:   
   POSITIVE= underlined text  Negative = text not underlined General:  fever, chills, sweats, generalized weakness, weight loss/gain,  
   loss of appetite Eyes:    blurred vision, eye pain, loss of vision, double vision ENT:    rhinorrhea, pharyngitis Respiratory:   cough, sputum production, SOB, FARFAN, wheezing, pleuritic pain  
Cardiology:   chest pain, palpitations, orthopnea, PND, edema, syncope Gastrointestinal:  abdominal pain , N/V, diarrhea, dysphagia, constipation, bleeding Genitourinary:  frequency, urgency, dysuria, hematuria, incontinence Muskuloskeletal :  arthralgia, myalgia, back pain Hematology:  easy bruising, nose or gum bleeding, lymphadenopathy Dermatological: rash, ulceration, pruritis, color change / jaundice Endocrine:   hot flashes or polydipsia Neurological:  headache, dizziness, confusion, focal weakness, paresthesia, Speech difficulties, memory loss, gait difficulty Psychological: Feelings of anxiety, depression, agitation Objective: VITALS:   
Visit Vitals /74 Pulse (!) 102 Temp 98 °F (36.7 °C) Resp 15 Ht 5' 4\" (1.626 m) Wt 118.4 kg (261 lb 0.4 oz) SpO2 97% BMI 44.80 kg/m² PHYSICAL EXAM: 
 
General:    Alert, cooperative, no distress, appears stated age. HEENT: Atraumatic, anicteric sclerae, pink conjunctivae No oral ulcers, mucosa moist, throat clear, dentition fair Neck:  Supple, symmetrical,  thyroid: non tender Lungs:   Decreased air entry and b/l wheezing Chest wall:  No tenderness  No Accessory muscle use. Heart:   Regular  rhythm,  No  murmur   No edema Abdomen:   Soft, non-tender. Not distended. Bowel sounds normal 
Extremities: No cyanosis. No clubbing,   
  Skin turgor normal, Capillary refill normal, Radial dial pulse 2+ Skin:     Not pale. Not Jaundiced  No rashes Psych:  Good insight. Not depressed. Not anxious or agitated. Neurologic: EOMs intact. No facial asymmetry. No aphasia or slurred speech. Symmetrical strength, Sensation grossly intact. Alert and oriented X 4.  
 
_______________________________________________________________________ Care Plan discussed with: 
  Comments Patient x Family RN Care Manager Consultant:     
_______________________________________________________________________ Expected  Disposition:  
Home with Family x HH/PT/OT/RN   
SNF/LTC   
LOAN   
________________________________________________________________________ TOTAL TIME:  60 Minutes Critical Care Provided     Minutes non procedure based Comments  
 x Reviewed previous records  
>50% of visit spent in counseling and coordination of care x Discussion with patient and/or family and questions answered 
  
 
________________________________________________________________________ Signed: Miguel Maharaj MD 
 
Procedures: see electronic medical records for all procedures/Xrays and details which were not copied into this note but were reviewed prior to creation of Plan. LAB DATA REVIEWED:   
Recent Results (from the past 24 hour(s)) EKG, 12 LEAD, INITIAL Collection Time: 02/18/19  3:41 PM  
Result Value Ref Range Ventricular Rate 87 BPM  
 Atrial Rate 87 BPM  
 P-R Interval 136 ms QRS Duration 82 ms Q-T Interval 364 ms QTC Calculation (Bezet) 438 ms Calculated P Axis 20 degrees Calculated R Axis -2 degrees Calculated T Axis 5 degrees Diagnosis Normal sinus rhythm Normal ECG When compared with ECG of 09-SEP-2014 13:12, 
Nonspecific T wave abnormality now evident in Anterior leads CBC WITH AUTOMATED DIFF Collection Time: 02/18/19  4:02 PM  
Result Value Ref Range WBC 8.1 3.6 - 11.0 K/uL  
 RBC 4.66 3.80 - 5.20 M/uL  
 HGB 12.2 11.5 - 16.0 g/dL HCT 38.0 35.0 - 47.0 % MCV 81.5 80.0 - 99.0 FL  
 MCH 26.2 26.0 - 34.0 PG  
 MCHC 32.1 30.0 - 36.5 g/dL  
 RDW 14.5 11.5 - 14.5 % PLATELET 938 415 - 471 K/uL MPV 10.8 8.9 - 12.9 FL  
 NRBC 0.0 0  WBC ABSOLUTE NRBC 0.00 0.00 - 0.01 K/uL NEUTROPHILS 62 32 - 75 % LYMPHOCYTES 28 12 - 49 % MONOCYTES 7 5 - 13 % EOSINOPHILS 2 0 - 7 % BASOPHILS 0 0 - 1 % IMMATURE GRANULOCYTES 1 (H) 0.0 - 0.5 % ABS. NEUTROPHILS 5.0 1.8 - 8.0 K/UL  
 ABS. LYMPHOCYTES 2.3 0.8 - 3.5 K/UL  
 ABS. MONOCYTES 0.6 0.0 - 1.0 K/UL  
 ABS. EOSINOPHILS 0.1 0.0 - 0.4 K/UL  
 ABS. BASOPHILS 0.0 0.0 - 0.1 K/UL  
 ABS. IMM. GRANS. 0.0 0.00 - 0.04 K/UL  
 DF AUTOMATED    
GLUCOSE, POC Collection Time: 02/18/19  5:36 PM  
Result Value Ref Range Glucose (POC) 302 (H) 65 - 100 mg/dL Performed by Elizabeth Denney POC LACTIC ACID Collection Time: 02/18/19  5:39 PM  
Result Value Ref Range Lactic Acid (POC) 1.81 0.40 - 2.00 mmol/L  
POC TROPONIN-I Collection Time: 02/18/19  5:45 PM  
Result Value Ref Range Troponin-I (POC) <0.04 0.00 - 0.08 ng/mL

## 2019-02-20 VITALS
SYSTOLIC BLOOD PRESSURE: 122 MMHG | HEIGHT: 64 IN | RESPIRATION RATE: 18 BRPM | HEART RATE: 79 BPM | WEIGHT: 261.02 LBS | DIASTOLIC BLOOD PRESSURE: 75 MMHG | BODY MASS INDEX: 44.56 KG/M2 | TEMPERATURE: 97.6 F | OXYGEN SATURATION: 99 %

## 2019-02-20 LAB
ANION GAP SERPL CALC-SCNC: 8 MMOL/L (ref 5–15)
ATRIAL RATE: 87 BPM
BUN SERPL-MCNC: 20 MG/DL (ref 6–20)
BUN/CREAT SERPL: 21 (ref 12–20)
CALCIUM SERPL-MCNC: 8.8 MG/DL (ref 8.5–10.1)
CALCULATED P AXIS, ECG09: 20 DEGREES
CALCULATED R AXIS, ECG10: -2 DEGREES
CALCULATED T AXIS, ECG11: 5 DEGREES
CHLORIDE SERPL-SCNC: 103 MMOL/L (ref 97–108)
CO2 SERPL-SCNC: 26 MMOL/L (ref 21–32)
CREAT SERPL-MCNC: 0.96 MG/DL (ref 0.55–1.02)
DIAGNOSIS, 93000: NORMAL
GLUCOSE BLD STRIP.AUTO-MCNC: 181 MG/DL (ref 65–100)
GLUCOSE SERPL-MCNC: 217 MG/DL (ref 65–100)
P-R INTERVAL, ECG05: 136 MS
POTASSIUM SERPL-SCNC: 3.8 MMOL/L (ref 3.5–5.1)
Q-T INTERVAL, ECG07: 364 MS
QRS DURATION, ECG06: 82 MS
QTC CALCULATION (BEZET), ECG08: 438 MS
SERVICE CMNT-IMP: ABNORMAL
SODIUM SERPL-SCNC: 137 MMOL/L (ref 136–145)
VENTRICULAR RATE, ECG03: 87 BPM

## 2019-02-20 PROCEDURE — 94640 AIRWAY INHALATION TREATMENT: CPT

## 2019-02-20 PROCEDURE — 74011250636 HC RX REV CODE- 250/636: Performed by: INTERNAL MEDICINE

## 2019-02-20 PROCEDURE — 74011250637 HC RX REV CODE- 250/637: Performed by: HOSPITALIST

## 2019-02-20 PROCEDURE — 82962 GLUCOSE BLOOD TEST: CPT

## 2019-02-20 PROCEDURE — 36415 COLL VENOUS BLD VENIPUNCTURE: CPT

## 2019-02-20 PROCEDURE — 80048 BASIC METABOLIC PNL TOTAL CA: CPT

## 2019-02-20 PROCEDURE — 74011000250 HC RX REV CODE- 250: Performed by: HOSPITALIST

## 2019-02-20 PROCEDURE — 94760 N-INVAS EAR/PLS OXIMETRY 1: CPT

## 2019-02-20 PROCEDURE — 74011250637 HC RX REV CODE- 250/637: Performed by: INTERNAL MEDICINE

## 2019-02-20 PROCEDURE — 74011636637 HC RX REV CODE- 636/637: Performed by: INTERNAL MEDICINE

## 2019-02-20 PROCEDURE — 74011250636 HC RX REV CODE- 250/636: Performed by: HOSPITALIST

## 2019-02-20 PROCEDURE — 74011636637 HC RX REV CODE- 636/637: Performed by: HOSPITALIST

## 2019-02-20 PROCEDURE — 99218 HC RM OBSERVATION: CPT

## 2019-02-20 RX ORDER — PREDNISONE 20 MG/1
40 TABLET ORAL
Qty: 6 TAB | Refills: 0 | Status: SHIPPED | OUTPATIENT
Start: 2019-02-20 | End: 2020-03-17 | Stop reason: ALTCHOICE

## 2019-02-20 RX ADMIN — SODIUM CHLORIDE 150 ML/HR: 900 INJECTION, SOLUTION INTRAVENOUS at 01:21

## 2019-02-20 RX ADMIN — INSULIN LISPRO 2 UNITS: 100 INJECTION, SOLUTION INTRAVENOUS; SUBCUTANEOUS at 09:18

## 2019-02-20 RX ADMIN — INSULIN LISPRO 15 UNITS: 100 INJECTION, SOLUTION INTRAVENOUS; SUBCUTANEOUS at 09:18

## 2019-02-20 RX ADMIN — PREDNISONE 40 MG: 20 TABLET ORAL at 09:19

## 2019-02-20 RX ADMIN — ENOXAPARIN SODIUM 40 MG: 40 INJECTION SUBCUTANEOUS at 09:19

## 2019-02-20 RX ADMIN — Medication 5 ML: at 05:06

## 2019-02-20 RX ADMIN — OXYCODONE AND ACETAMINOPHEN 1 TABLET: 5; 325 TABLET ORAL at 01:20

## 2019-02-20 RX ADMIN — FLUTICASONE FUROATE AND VILANTEROL TRIFENATATE 1 PUFF: 100; 25 POWDER RESPIRATORY (INHALATION) at 09:22

## 2019-02-20 RX ADMIN — LEVOTHYROXINE SODIUM 25 MCG: 25 TABLET ORAL at 09:19

## 2019-02-20 RX ADMIN — OXYCODONE AND ACETAMINOPHEN 1 TABLET: 10; 325 TABLET ORAL at 06:50

## 2019-02-20 RX ADMIN — IPRATROPIUM BROMIDE AND ALBUTEROL SULFATE 3 ML: .5; 3 SOLUTION RESPIRATORY (INHALATION) at 07:31

## 2019-02-20 RX ADMIN — PANTOPRAZOLE SODIUM 40 MG: 40 TABLET, DELAYED RELEASE ORAL at 09:19

## 2019-02-20 RX ADMIN — METFORMIN HYDROCHLORIDE 1000 MG: 500 TABLET ORAL at 09:19

## 2019-02-20 NOTE — PROGRESS NOTES
Patient discharged home with family. Copy of discharge instructions, prescriptions and all patient belongings given to patient prior to discharge. IV removed. Patient send to discharge lobby via wheelchair. All cabinets checked for patient belongings.

## 2019-02-20 NOTE — PROGRESS NOTES
All in agreement with d/c to home today without d/c needs. Per Dayana:  DAYANA COMPLETED 
AND SUBMITTED APPLICATION TO DSS.

## 2019-02-20 NOTE — PROGRESS NOTES
Bedside and Verbal shift change report given to Barbara (oncoming nurse) by Jonathon Brown RN (offgoing nurse). Report included the following information Kardex, MAR and Recent Results.

## 2019-02-20 NOTE — PROGRESS NOTES
Problem: Falls - Risk of 
Goal: *Absence of Falls Document Sunny Wyatt Fall Risk and appropriate interventions in the flowsheet. Outcome: Progressing Towards Goal 
Fall Risk Interventions: 
  
 
  
 
Medication Interventions: Patient to call before getting OOB, Teach patient to arise slowly

## 2019-02-20 NOTE — PROGRESS NOTES
Bedside shift change report given to [address] [place] [address] Ernestine (oncoming nurse) by Danielle Mena RN (offgoing nurse). Report included the following information SBAR, Kardex, Procedure Summary, Intake/Output, MAR and Recent Results.

## 2019-07-17 ENCOUNTER — OFFICE VISIT (OUTPATIENT)
Dept: NEUROLOGY | Age: 49
End: 2019-07-17

## 2019-07-17 VITALS
HEIGHT: 64 IN | DIASTOLIC BLOOD PRESSURE: 74 MMHG | OXYGEN SATURATION: 99 % | BODY MASS INDEX: 42.3 KG/M2 | WEIGHT: 247.8 LBS | HEART RATE: 90 BPM | SYSTOLIC BLOOD PRESSURE: 122 MMHG

## 2019-07-17 DIAGNOSIS — J45.41 MODERATE PERSISTENT ASTHMA WITH ACUTE EXACERBATION: ICD-10-CM

## 2019-07-17 DIAGNOSIS — I10 ESSENTIAL HYPERTENSION: ICD-10-CM

## 2019-07-17 DIAGNOSIS — E11.9 CONTROLLED TYPE 2 DIABETES MELLITUS WITHOUT COMPLICATION, WITH LONG-TERM CURRENT USE OF INSULIN (HCC): ICD-10-CM

## 2019-07-17 DIAGNOSIS — E03.9 ACQUIRED HYPOTHYROIDISM: ICD-10-CM

## 2019-07-17 DIAGNOSIS — G43.709 TRANSFORMED MIGRAINE WITHOUT AURA: Primary | ICD-10-CM

## 2019-07-17 DIAGNOSIS — Z79.4 CONTROLLED TYPE 2 DIABETES MELLITUS WITHOUT COMPLICATION, WITH LONG-TERM CURRENT USE OF INSULIN (HCC): ICD-10-CM

## 2019-07-17 RX ORDER — HYDROCHLOROTHIAZIDE 12.5 MG/1
12.5 TABLET ORAL DAILY
COMMUNITY

## 2019-07-17 RX ORDER — MEDROXYPROGESTERONE ACETATE 10 MG/1
TABLET ORAL DAILY
COMMUNITY
End: 2019-11-19

## 2019-07-17 RX ORDER — INSULIN GLARGINE 100 [IU]/ML
50 INJECTION, SOLUTION SUBCUTANEOUS
COMMUNITY
End: 2022-06-07 | Stop reason: ALTCHOICE

## 2019-07-17 RX ORDER — BUTALBITAL, ACETAMINOPHEN AND CAFFEINE 50; 325; 40 MG/1; MG/1; MG/1
TABLET ORAL
Qty: 30 TAB | Refills: 3 | Status: SHIPPED | OUTPATIENT
Start: 2019-07-17 | End: 2019-11-19 | Stop reason: SDUPTHER

## 2019-07-17 RX ORDER — SUMATRIPTAN 100 MG/1
100 TABLET, FILM COATED ORAL
Qty: 9 TAB | Refills: 5 | Status: SHIPPED | OUTPATIENT
Start: 2019-07-17 | End: 2019-07-17

## 2019-07-17 RX ORDER — INSULIN ASPART 100 [IU]/ML
INJECTION, SOLUTION INTRAVENOUS; SUBCUTANEOUS
COMMUNITY

## 2019-07-17 RX ORDER — AMITRIPTYLINE HYDROCHLORIDE 50 MG/1
50 TABLET, FILM COATED ORAL
Qty: 90 TAB | Refills: 2 | Status: SHIPPED | OUTPATIENT
Start: 2019-07-17 | End: 2019-11-12 | Stop reason: SDUPTHER

## 2019-07-17 RX ORDER — ATORVASTATIN CALCIUM 20 MG/1
TABLET, FILM COATED ORAL DAILY
COMMUNITY

## 2019-07-17 NOTE — PROGRESS NOTES
NEUROLOGY HISTORY AND PHYSICAL    Name Gavi Quezada Age 52 y.o. MRN [id number]  1970     Referring Physician: Adrián Cohen NP      Chief Complaint: Migraines. This is a 52 y.o. Right handed female with a medical history of hyperlipidemia. Started headaches 40 years ago. Was treated for migraines in her 25s. She took medications once a day. Her headaches slowly subsided. Recently (for the past year) she has had an increase in her migraines. She is perimenopausal.     Assessment and Plan  1. Transformed migraines. Head CT  Start topamax and sumatriptan and fioricet  Avoid rebounds    2. Perimenopausal  Probably has something to do with her migraine. 3. Diabetes  continue inlin    Patient Allergies  Patient has no known allergies. Current Outpatient Medications   Medication Sig    atorvastatin (LIPITOR) 20 mg tablet Take  by mouth daily.  hydroCHLOROthiazide (HYDRODIURIL) 12.5 mg tablet Take 12.5 mg by mouth daily.  insulin aspart U-100 (NOVOLOG FLEXPEN U-100 INSULIN) 100 unit/mL (3 mL) inpn by SubCUTAneous route.  insulin glargine (LANTUS,BASAGLAR) 100 unit/mL (3 mL) inpn by SubCUTAneous route.  liraglutide (VICTOZA 2-PILAR) 0.6 mg/0.1 mL (18 mg/3 mL) pnij 0.6 mg by SubCUTAneous route.  medroxyPROGESTERone (PROVERA) 10 mg tablet Take  by mouth daily.  albuterol (ACCUNEB) 1.25 mg/3 mL nebu Take 6 mL by inhalation every four (4) hours as needed (wheezing).  metFORMIN (GLUCOPHAGE) 500 mg tablet Take 1,000 mg by mouth two (2) times daily (with meals).  esomeprazole (NEXIUM) 40 mg capsule Take 40 mg by mouth daily.  levothyroxine (SYNTHROID) 25 mcg tablet Take 100 mcg by mouth Daily (before breakfast).  amitriptyline (ELAVIL) 25 mg tablet Take 25 mg by mouth nightly.  predniSONE (DELTASONE) 20 mg tablet Take 40 mg by mouth daily (with breakfast).  methocarbamol (ROBAXIN) 500 mg tablet Take 1 Tab by mouth four (4) times daily.     naproxen (NAPROSYN) 500 mg tablet Take 1 Tab by mouth every twelve (12) hours as needed for Pain.  hydrocodone-acetaminophen (NORCO) 5-325 mg per tablet Take 1 tablet by mouth every four (4) hours as needed for Pain.  glipiZIDE (GLUCOTROL) 5 mg tablet Take 5 mg by mouth two (2) times a day.  fluticasone-salmeterol (ADVAIR DISKUS) 250-50 mcg/dose diskus inhaler Take 1 Puff by inhalation two (2) times a day.  albuterol (VENTOLIN HFA) 90 mcg/actuation inhaler Take 2 Puffs by inhalation every four (4) hours as needed for Wheezing. No current facility-administered medications for this visit. Past Medical History:   Diagnosis Date    Asthma     followed by PCP    Diabetes ([facility] [address]     Hypothyroid     Unspecified sleep apnea     CPAP       Social History     Tobacco Use    Smoking status: Never Smoker   Substance Use Topics    Alcohol use: No     Family history  daughter migraines  Diabetes        Exam  Visit Vitals  /74   Pulse 90   Ht 5' 4\" (1.626 m)   Wt 247 lb 12.8 oz (112.4 kg)   SpO2 99%   BMI 42.53 kg/m²      General: Well developed, well nourished. Patient in no apparent distress   Head: Normocephalic, atraumatic, anicteric sclera   Neck Normal ROM, No thyromegally   Lungs:  Clear to auscultation bilaterally, No wheezes or rubs   Cardiac: Regular rate and rhythm with no murmurs. Abd: Bowel sounds were audible. No tenderness on palpation   Ext: No pedal edema   Skin: Supple no rash     NeurologicExam:  Mental Status: Alert and oriented to person place and time   Speech: Fluent no aphasia or dysarthria. Cranial Nerves:  II - XII Intact   Motor:  Full and symmetric strength of upper and lower proximal and distal muscles. Normal bulk and tone. Reflexes:   Deep tendon reflexes 2+/4 and symmetric. Sensory:   Symmetric and intact with no perceived deficits modalities involving small or large fibers. Gait:  Gait is balanced and fluid with normal arm swing. Tremor:   No tremor noted.    Cerebellar: Coordination intact. Neurovascular: No carotid bruits.  No JVD         Lab Review  Lab Results   Component Value Date/Time    WBC 8.1 02/18/2019 04:02 PM    HCT 38.0 02/18/2019 04:02 PM    HGB 12.2 02/18/2019 04:02 PM    PLATELET 142 67/02/5673 04:02 PM     Lab Results   Component Value Date/Time    Sodium 137 02/20/2019 01:26 AM    Potassium 3.8 02/20/2019 01:26 AM    Chloride 103 02/20/2019 01:26 AM    CO2 26 02/20/2019 01:26 AM    Glucose 217 (H) 02/20/2019 01:26 AM    BUN 20 02/20/2019 01:26 AM    Creatinine 0.96 02/20/2019 01:26 AM    Calcium 8.8 02/20/2019 01:26 AM

## 2019-07-17 NOTE — PATIENT INSTRUCTIONS
A Healthy Lifestyle: Care Instructions  Your Care Instructions    A healthy lifestyle can help you feel good, stay at a healthy weight, and have plenty of energy for both work and play. A healthy lifestyle is something you can share with your whole family. A healthy lifestyle also can lower your risk for serious health problems, such as high blood pressure, heart disease, and diabetes. You can follow a few steps listed below to improve your health and the health of your family. Follow-up care is a key part of your treatment and safety. Be sure to make and go to all appointments, and call your doctor if you are having problems. It's also a good idea to know your test results and keep a list of the medicines you take. How can you care for yourself at home? · Do not eat too much sugar, fat, or fast foods. You can still have dessert and treats now and then. The goal is moderation. · Start small to improve your eating habits. Pay attention to portion sizes, drink less juice and soda pop, and eat more fruits and vegetables. ? Eat a healthy amount of food. A 3-ounce serving of meat, for example, is about the size of a deck of cards. Fill the rest of your plate with vegetables and whole grains. ? Limit the amount of soda and sports drinks you have every day. Drink more water when you are thirsty. ? Eat at least 5 servings of fruits and vegetables every day. It may seem like a lot, but it is not hard to reach this goal. A serving or helping is 1 piece of fruit, 1 cup of vegetables, or 2 cups of leafy, raw vegetables. Have an apple or some carrot sticks as an afternoon snack instead of a candy bar. Try to have fruits and/or vegetables at every meal.  · Make exercise part of your daily routine. You may want to start with simple activities, such as walking, bicycling, or slow swimming. Try to be active 30 to 60 minutes every day. You do not need to do all 30 to 60 minutes all at once.  For example, you can exercise 3 times a day for 10 or 20 minutes. Moderate exercise is safe for most people, but it is always a good idea to talk to your doctor before starting an exercise program.  · Keep moving. [place] the lawn, work in the garden, or [place]. Take the stairs instead of the elevator at work. · If you smoke, quit. People who smoke have an increased risk for heart attack, stroke, cancer, and other lung illnesses. Quitting is hard, but there are ways to boost your chance of quitting tobacco for good. ? Use nicotine gum, patches, or lozenges. ? Ask your doctor about stop-smoking programs and medicines. ? Keep trying. In addition to reducing your risk of diseases in the future, you will notice some benefits soon after you stop using tobacco. If you have shortness of breath or asthma symptoms, they will likely get better within a few weeks after you quit. · Limit how much alcohol you drink. Moderate amounts of alcohol (up to 2 drinks a day for men, 1 drink a day for women) are okay. But drinking too much can lead to liver problems, high blood pressure, and other health problems. Family health  If you have a family, there are many things you can do together to improve your health. · Eat meals together as a family as often as possible. · Eat healthy foods. This includes fruits, vegetables, lean meats and dairy, and whole grains. · Include your family in your fitness plan. Most people think of activities such as jogging or tennis as the way to fitness, but there are many ways you and your family can be more active. Anything that makes you breathe hard and gets your heart pumping is exercise. Here are some tips:  ? Walk to do errands or to take your child to school or the bus.  ? Go for a family bike ride after dinner instead of watching TV. Where can you learn more? Go to http://alpesh-augustin.info/. Enter E043 in the search box to learn more about \"A Healthy Lifestyle: Care Instructions. \"  Current as of: September 11, 2018  Content Version: 11.9  © 0862-7854 [employer]. Care instructions adapted under license by [employer] (which disclaims liability or warranty for this information). If you have questions about a medical condition or this instruction, always ask your healthcare professional. [address] any warranty or liability for your use of this information.

## 2019-08-02 ENCOUNTER — DOCUMENTATION ONLY (OUTPATIENT)
Dept: NEUROLOGY | Age: 49
End: 2019-08-02

## 2019-08-02 NOTE — PROGRESS NOTES
Received a request for the patient's office note the referring doctor request. Sent the requested office notes.

## 2019-08-12 ENCOUNTER — HOSPITAL ENCOUNTER (OUTPATIENT)
Dept: CT IMAGING | Age: 49
Discharge: HOME | End: 2019-08-12
Attending: PSYCHIATRY & NEUROLOGY
Payer: COMMERCIAL

## 2019-08-12 DIAGNOSIS — G43.709 TRANSFORMED MIGRAINE WITHOUT AURA: ICD-10-CM

## 2019-08-12 PROCEDURE — 70450 CT HEAD/BRAIN W/O DYE: CPT

## 2019-11-12 DIAGNOSIS — G43.709 TRANSFORMED MIGRAINE WITHOUT AURA: ICD-10-CM

## 2019-11-13 RX ORDER — AMITRIPTYLINE HYDROCHLORIDE 50 MG/1
TABLET, FILM COATED ORAL
Qty: 30 TAB | Refills: 4 | Status: SHIPPED | OUTPATIENT
Start: 2019-11-13 | End: 2020-03-17 | Stop reason: SDUPTHER

## 2019-11-19 ENCOUNTER — OFFICE VISIT (OUTPATIENT)
Dept: NEUROLOGY | Age: 49
End: 2019-11-19

## 2019-11-19 VITALS
HEART RATE: 91 BPM | OXYGEN SATURATION: 98 % | DIASTOLIC BLOOD PRESSURE: 70 MMHG | BODY MASS INDEX: 42.4 KG/M2 | SYSTOLIC BLOOD PRESSURE: 118 MMHG | WEIGHT: 247 LBS

## 2019-11-19 DIAGNOSIS — E11.9 CONTROLLED TYPE 2 DIABETES MELLITUS WITHOUT COMPLICATION, WITH LONG-TERM CURRENT USE OF INSULIN (HCC): ICD-10-CM

## 2019-11-19 DIAGNOSIS — E03.9 ACQUIRED HYPOTHYROIDISM: ICD-10-CM

## 2019-11-19 DIAGNOSIS — I10 ESSENTIAL HYPERTENSION: ICD-10-CM

## 2019-11-19 DIAGNOSIS — Z79.4 CONTROLLED TYPE 2 DIABETES MELLITUS WITHOUT COMPLICATION, WITH LONG-TERM CURRENT USE OF INSULIN (HCC): ICD-10-CM

## 2019-11-19 DIAGNOSIS — G43.709 TRANSFORMED MIGRAINE WITHOUT AURA: Primary | ICD-10-CM

## 2019-11-19 PROBLEM — E66.01 OBESITY, MORBID (HCC): Status: ACTIVE | Noted: 2019-11-19

## 2019-11-19 RX ORDER — BUTALBITAL, ACETAMINOPHEN AND CAFFEINE 50; 325; 40 MG/1; MG/1; MG/1
TABLET ORAL
Qty: 30 TAB | Refills: 3 | Status: SHIPPED | OUTPATIENT
Start: 2019-11-19 | End: 2020-03-17 | Stop reason: SDUPTHER

## 2019-11-19 RX ORDER — FLUTICASONE PROPIONATE AND SALMETEROL 250; 50 UG/1; UG/1
1 POWDER RESPIRATORY (INHALATION) 2 TIMES DAILY
Qty: 2 INHALER | Refills: 4 | Status: SHIPPED | OUTPATIENT
Start: 2019-11-19

## 2019-11-19 NOTE — PATIENT INSTRUCTIONS

## 2019-11-19 NOTE — PROGRESS NOTES
Name: Rodney Jacobsen    Chief Complaint: headaches    Patient comes for a follow up visit. She has been having more headaches. She had not been taking the amitriptyline because she thought she did not have any refills on it. She has not been compliant with singulair and I explained that her allergies were part of her migraine process and it was important for her take both medications. I also discussed why I didn't swithch her to topiramate and this was because she was doing well on the elavil. Assesment and Plan  1. 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 No more than 3 in a day. Can't use more than 10 days a month  Dispense: 30 Tab; Refill: 3  -continue elavil  - continue fioricet  - stop the imitred because it is not effective    2. Controlled type 2 diabetes mellitus without complication, with long-term current use of insulin ([address]  Explained the importance of strict sugar control    3. Acquired hypothyroidism  continue levothyroxine    4. Essential hypertension  Continue hctz      Allergies  Patient has no known allergies. Medications  Current Outpatient Medications   Medication Sig    amitriptyline (ELAVIL) 50 mg tablet TAKE 1 TABLET BY MOUTH EACH NIGHT     atorvastatin (LIPITOR) 20 mg tablet Take  by mouth daily.  hydroCHLOROthiazide (HYDRODIURIL) 12.5 mg tablet Take 12.5 mg by mouth daily.  insulin aspart U-100 (NOVOLOG FLEXPEN U-100 INSULIN) 100 unit/mL (3 mL) inpn by SubCUTAneous route.  insulin glargine (LANTUS,BASAGLAR) 100 unit/mL (3 mL) inpn by SubCUTAneous route.  liraglutide (VICTOZA 2-PILAR) 0.6 mg/0.1 mL (18 mg/3 mL) pnij 0.6 mg by SubCUTAneous route.  medroxyPROGESTERone (PROVERA) 10 mg tablet Take  by mouth daily.     butalbital-acetaminophen-caffeine (FIORICET, ESGIC) -40 mg per tablet Take one to two by mouth on onset of migraine may take and additional pill in 40 min if needed. No more than 3 in a day. Can't use more than 10 days a month    albuterol (ACCUNEB) 1.25 mg/3 mL nebu Take 6 mL by inhalation every four (4) hours as needed (wheezing).  methocarbamol (ROBAXIN) 500 mg tablet Take 1 Tab by mouth four (4) times daily.  naproxen (NAPROSYN) 500 mg tablet Take 1 Tab by mouth every twelve (12) hours as needed for Pain.  hydrocodone-acetaminophen (NORCO) 5-325 mg per tablet Take 1 tablet by mouth every four (4) hours as needed for Pain.  metFORMIN (GLUCOPHAGE) 500 mg tablet Take 1,000 mg by mouth two (2) times daily (with meals).  esomeprazole (NEXIUM) 40 mg capsule Take 40 mg by mouth daily.  glipiZIDE (GLUCOTROL) 5 mg tablet Take 5 mg by mouth two (2) times a day.  levothyroxine (SYNTHROID) 25 mcg tablet Take 100 mcg by mouth Daily (before breakfast).  fluticasone-salmeterol (ADVAIR DISKUS) 250-50 mcg/dose diskus inhaler Take 1 Puff by inhalation two (2) times a day.  albuterol (VENTOLIN HFA) 90 mcg/actuation inhaler Take 2 Puffs by inhalation every four (4) hours as needed for Wheezing.  predniSONE (DELTASONE) 20 mg tablet Take 40 mg by mouth daily (with breakfast). No current facility-administered medications for this visit. Medical History  Past Medical History:   Diagnosis Date    Asthma     followed by PCP    Diabetes ([facility] [address]     Hypothyroid     Unspecified sleep apnea     CPAP     Review of Systems   Constitutional: Negative for chills and fever. HENT: Negative for ear pain. Eyes: Negative for pain and discharge. Respiratory: Positive for wheezing. Negative for cough and hemoptysis. Cardiovascular: Negative for chest pain and claudication. Gastrointestinal: Negative for constipation and diarrhea. Genitourinary: Negative for flank pain and hematuria. Musculoskeletal: Positive for back pain and myalgias.    Skin: Negative for itching and rash. Neurological: Positive for headaches. Endo/Heme/Allergies: Negative for environmental allergies. Does not bruise/bleed easily. Psychiatric/Behavioral: Negative for depression and hallucinations. Exam:  Visit Vitals  /70   Pulse 91   Wt 247 lb (112 kg)   SpO2 98%   BMI 42.40 kg/m²      General: Well developed, well nourished. Patient in no apparent distress   Head: Normocephalic, atraumatic, anicteric sclera   Neck Normal ROM, No thyromegally   Lungs:  Clear to auscultation bilaterally, No wheezes or rubs   Cardiac: Regular rate and rhythm with no murmurs. Abd: Bowel sounds were audible. No tenderness on palpation   Ext: No pedal edema   Skin: Supple no rash     NeurologicExam:  Mental Status: Alert and oriented to person place and time   Speech: Fluent no aphasia or dysarthria. Cranial Nerves:  II - XII Intact   Motor:  Full and symmetric strength of upper and lower proximal and distal muscles. Normal bulk and tone. Reflexes:   Deep tendon reflexes 2+/4 and symmetric. Sensory:   Symmetric and intact with no perceived deficits modalities involving small or large fibers. Gait:  Gait is balanced and fluid with normal arm swing. Tremor:   No tremor noted. Cerebellar:  Coordination intact. Neurovascular: No carotid bruits. No JVD     Imaging  CT Results (most recent):  Results from Hospital Encounter encounter on 08/12/19   CT HEAD WO CONT    Narrative EXAM: CT HEAD WO CONT    INDICATION: transformed migraine    COMPARISON: None. CONTRAST: None. TECHNIQUE: Unenhanced CT of the head was performed using 5 mm images. Brain and  bone windows were generated. CT dose reduction was achieved through use of a  standardized protocol tailored for this examination and automatic exposure  control for dose modulation. FINDINGS:  There is no extra-axial fluid collection hemorrhage shift or masses . Impression IMPRESSION: Negative.        Lab Review  Lab Results   Component Value Date/Time    WBC 8.1 02/18/2019 04:02 PM    HCT 38.0 02/18/2019 04:02 PM    HGB 12.2 02/18/2019 04:02 PM    PLATELET 505 71/67/2808 04:02 PM       Lab Results   Component Value Date/Time    Sodium 137 02/20/2019 01:26 AM    Potassium 3.8 02/20/2019 01:26 AM    Chloride 103 02/20/2019 01:26 AM    CO2 26 02/20/2019 01:26 AM    Glucose 217 (H) 02/20/2019 01:26 AM    BUN 20 02/20/2019 01:26 AM    Creatinine 0.96 02/20/2019 01:26 AM    Calcium 8.8 02/20/2019 01:26 AM

## 2020-03-17 ENCOUNTER — OFFICE VISIT (OUTPATIENT)
Dept: NEUROLOGY | Age: 50
End: 2020-03-17

## 2020-03-17 VITALS
BODY MASS INDEX: 44.46 KG/M2 | DIASTOLIC BLOOD PRESSURE: 70 MMHG | OXYGEN SATURATION: 97 % | WEIGHT: 259 LBS | SYSTOLIC BLOOD PRESSURE: 128 MMHG | HEART RATE: 74 BPM

## 2020-03-17 DIAGNOSIS — G43.709 TRANSFORMED MIGRAINE WITHOUT AURA: ICD-10-CM

## 2020-03-17 DIAGNOSIS — M54.81 BILATERAL OCCIPITAL NEURALGIA: Primary | ICD-10-CM

## 2020-03-17 DIAGNOSIS — E11.9 CONTROLLED TYPE 2 DIABETES MELLITUS WITHOUT COMPLICATION, WITH LONG-TERM CURRENT USE OF INSULIN (HCC): ICD-10-CM

## 2020-03-17 DIAGNOSIS — I10 ESSENTIAL HYPERTENSION: ICD-10-CM

## 2020-03-17 DIAGNOSIS — E03.9 ACQUIRED HYPOTHYROIDISM: ICD-10-CM

## 2020-03-17 DIAGNOSIS — Z79.4 CONTROLLED TYPE 2 DIABETES MELLITUS WITHOUT COMPLICATION, WITH LONG-TERM CURRENT USE OF INSULIN (HCC): ICD-10-CM

## 2020-03-17 RX ORDER — GABAPENTIN 300 MG/1
300 CAPSULE ORAL
Qty: 30 CAP | Refills: 3 | Status: SHIPPED | OUTPATIENT
Start: 2020-03-17 | End: 2020-09-24

## 2020-03-17 RX ORDER — AMITRIPTYLINE HYDROCHLORIDE 50 MG/1
50 TABLET, FILM COATED ORAL
Qty: 30 TAB | Refills: 4 | Status: SHIPPED | OUTPATIENT
Start: 2020-03-17 | End: 2020-11-03

## 2020-03-17 RX ORDER — BUTALBITAL, ACETAMINOPHEN AND CAFFEINE 50; 325; 40 MG/1; MG/1; MG/1
TABLET ORAL
Qty: 30 TAB | Refills: 3 | Status: SHIPPED | OUTPATIENT
Start: 2020-03-17 | End: 2020-08-13

## 2020-03-17 RX ORDER — CYCLOBENZAPRINE HCL 5 MG
5 TABLET ORAL
Qty: 30 TAB | Refills: 5 | Status: SHIPPED | OUTPATIENT
Start: 2020-03-17 | End: 2020-07-27

## 2020-03-17 NOTE — PROGRESS NOTES
Name: Heidi Berry    Chief Complaint: headaches    Migraines controlled on current medications however having left cervicogenic pain and tenderness does not have muscle relaxer. No dizziness or cranial nerve complaints. No syncope. No changes in vision. She is compliant with allergy medication. Assesment and Plan  1. 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 Controlled type 2 diabetes mellitus without complication, with long-term current use of insulin ([address]  Explained the importance of strict sugar control    3. Acquired hypothyroidism  continue levothyroxine    4. Essential hypertension  Continue hctz      Allergies  Pollen extracts     Medications  Current Outpatient Medications   Medication Sig    fluticasone propion-salmeterol (ADVAIR DISKUS) 250-50 mcg/dose diskus inhaler Take 1 Puff by inhalation two (2) times a day.  butalbital-acetaminophen-caffeine (FIORICET, ESGIC) -40 mg per tablet Take one to two by mouth on onset of migraine may take and additional pill in 40 min if needed. No more than 3 in a day. Can't use more than 10 days a month    amitriptyline (ELAVIL) 50 mg tablet TAKE 1 TABLET BY MOUTH EACH NIGHT     atorvastatin (LIPITOR) 20 mg tablet Take  by mouth daily.  hydroCHLOROthiazide (HYDRODIURIL) 12.5 mg tablet Take 12.5 mg by mouth daily.  insulin aspart U-100 (NOVOLOG FLEXPEN U-100 INSULIN) 100 unit/mL (3 mL) inpn by SubCUTAneous route.  insulin glargine (LANTUS,BASAGLAR) 100 unit/mL (3 mL) inpn by SubCUTAneous route.  liraglutide (VICTOZA 2-PILAR) 0.6 mg/0.1 mL (18 mg/3 mL) pnij 0.6 mg by SubCUTAneous route.  albuterol (ACCUNEB) 1.25 mg/3 mL nebu Take 6 mL by inhalation every four (4) hours as needed (wheezing).  methocarbamol (ROBAXIN) 500 mg tablet Take 1 Tab by mouth four (4) times daily.     naproxen (NAPROSYN) 500 mg tablet Take 1 Tab by mouth every twelve (12) hours as needed for Pain.  metFORMIN (GLUCOPHAGE) 500 mg tablet Take 1,000 mg by mouth two (2) times daily (with meals).  esomeprazole (NEXIUM) 40 mg capsule Take 40 mg by mouth daily.  levothyroxine (SYNTHROID) 25 mcg tablet Take 100 mcg by mouth Daily (before breakfast).  albuterol (VENTOLIN HFA) 90 mcg/actuation inhaler Take 2 Puffs by inhalation every four (4) hours as needed for Wheezing.  predniSONE (DELTASONE) 20 mg tablet Take 40 mg by mouth daily (with breakfast). No current facility-administered medications for this visit. Medical History  Past Medical History:   Diagnosis Date    Asthma     followed by PCP    Diabetes ([facility] [address]     Hypothyroid     Unspecified sleep apnea     CPAP     Review of Systems   Constitutional: Negative for chills and fever. HENT: Negative for ear pain. Eyes: Negative for pain and discharge. Respiratory: Positive for wheezing. Negative for cough and hemoptysis. Cardiovascular: Negative for chest pain and claudication. Gastrointestinal: Negative for constipation and diarrhea. Genitourinary: Negative for flank pain and hematuria. Musculoskeletal: Positive for back pain and myalgias. Skin: Negative for itching and rash. Neurological: Positive for headaches. Endo/Heme/Allergies: Negative for environmental allergies. Does not bruise/bleed easily. Psychiatric/Behavioral: Negative for depression and hallucinations. Exam:  Visit Vitals  /70   Pulse 74   Wt 259 lb (117.5 kg)   SpO2 97%   BMI 44.46 kg/m²      General: Well developed, well nourished. Patient in no apparent distress   Head: Normocephalic, atraumatic, anicteric sclera   Neck Normal ROM, No thyromegally  temdermess left neck no bruit   Lungs:  Normal breath sounds   Cardiac: Regular rate and rhythm   Abd: Bowel sounds were audible.     Ext: No pedal edema   Skin: Supple no rash     NeurologicExam:  Mental Status: Alert and oriented to person place and time   Speech: Fluent no aphasia or dysarthria. Cranial Nerves:  II - XII Intact   Motor:  Full and symmetric strength of upper and lower proximal and distal muscles. Normal bulk and tone. Reflexes:   Deep tendon reflexes 2+/4 and symmetric. Sensory:   Symmetric and intact. Gait:  Gait is balanced and fluid with normal arm swing. Tremor:   No tremor noted. Cerebellar:  Coordination intact. Imaging  CT Results (most recent):  Results from Hospital Encounter encounter on 08/12/19   CT HEAD WO CONT    Narrative EXAM: CT HEAD WO CONT    INDICATION: transformed migraine    COMPARISON: None. CONTRAST: None. TECHNIQUE: Unenhanced CT of the head was performed using 5 mm images. Brain and  bone windows were generated. CT dose reduction was achieved through use of a  standardized protocol tailored for this examination and automatic exposure  control for dose modulation. FINDINGS:  There is no extra-axial fluid collection hemorrhage shift or masses . Impression IMPRESSION: Negative.        Lab Review  Lab Results   Component Value Date/Time    WBC 8.1 02/18/2019 04:02 PM    HCT 38.0 02/18/2019 04:02 PM    HGB 12.2 02/18/2019 04:02 PM    PLATELET 543 84/94/6669 04:02 PM       Lab Results   Component Value Date/Time    Sodium 137 02/20/2019 01:26 AM    Potassium 3.8 02/20/2019 01:26 AM    Chloride 103 02/20/2019 01:26 AM    CO2 26 02/20/2019 01:26 AM    Glucose 217 (H) 02/20/2019 01:26 AM    BUN 20 02/20/2019 01:26 AM    Creatinine 0.96 02/20/2019 01:26 AM    Calcium 8.8 02/20/2019 01:26 AM

## 2020-07-27 ENCOUNTER — OFFICE VISIT (OUTPATIENT)
Dept: NEUROLOGY | Age: 50
End: 2020-07-27

## 2020-07-27 VITALS
RESPIRATION RATE: 18 BRPM | HEIGHT: 64 IN | BODY MASS INDEX: 41.52 KG/M2 | TEMPERATURE: 97.3 F | DIASTOLIC BLOOD PRESSURE: 60 MMHG | OXYGEN SATURATION: 98 % | HEART RATE: 73 BPM | WEIGHT: 243.2 LBS | SYSTOLIC BLOOD PRESSURE: 110 MMHG

## 2020-07-27 DIAGNOSIS — E11.65 UNCONTROLLED TYPE 2 DIABETES MELLITUS WITH HYPERGLYCEMIA (HCC): ICD-10-CM

## 2020-07-27 DIAGNOSIS — M54.81 BILATERAL OCCIPITAL NEURALGIA: ICD-10-CM

## 2020-07-27 DIAGNOSIS — G43.711 INTRACTABLE CHRONIC MIGRAINE WITHOUT AURA AND WITH STATUS MIGRAINOSUS: Primary | ICD-10-CM

## 2020-07-27 DIAGNOSIS — R51.9 NEW ONSET OF HEADACHES: ICD-10-CM

## 2020-07-27 DIAGNOSIS — N92.1 MENORRHAGIA WITH IRREGULAR CYCLE: ICD-10-CM

## 2020-07-27 DIAGNOSIS — G44.209 TENSION VASCULAR HEADACHE: ICD-10-CM

## 2020-07-27 PROBLEM — I10 ESSENTIAL HYPERTENSION: Status: ACTIVE | Noted: 2020-07-27

## 2020-07-27 RX ORDER — MONTELUKAST SODIUM 10 MG/1
TABLET ORAL
COMMUNITY
Start: 2020-07-02 | End: 2020-07-27

## 2020-07-27 RX ORDER — BLOOD SUGAR DIAGNOSTIC
STRIP MISCELLANEOUS
COMMUNITY
Start: 2020-06-04

## 2020-07-27 RX ORDER — PANTOPRAZOLE SODIUM 40 MG/1
TABLET, DELAYED RELEASE ORAL
COMMUNITY
Start: 2020-07-02 | End: 2020-07-27

## 2020-07-27 RX ORDER — MELATONIN
1000 DAILY
COMMUNITY
Start: 2019-10-21

## 2020-07-27 RX ORDER — BUDESONIDE AND FORMOTEROL FUMARATE DIHYDRATE 160; 4.5 UG/1; UG/1
AEROSOL RESPIRATORY (INHALATION)
COMMUNITY
Start: 2020-06-29 | End: 2022-06-07 | Stop reason: ALTCHOICE

## 2020-07-27 RX ORDER — INSULIN LISPRO 100 [IU]/ML
1 INJECTION, SOLUTION INTRAVENOUS; SUBCUTANEOUS DAILY
COMMUNITY
Start: 2019-10-04 | End: 2022-06-07 | Stop reason: ALTCHOICE

## 2020-07-27 RX ORDER — GABAPENTIN 100 MG/1
CAPSULE ORAL
Qty: 240 CAP | Refills: 2 | Status: SHIPPED | OUTPATIENT
Start: 2020-07-27 | End: 2021-06-01

## 2020-07-27 RX ORDER — CETIRIZINE HCL 10 MG
10 TABLET ORAL DAILY
COMMUNITY
Start: 2019-11-05

## 2020-07-27 RX ORDER — GUAIFENESIN 100 MG/5ML
81 LIQUID (ML) ORAL DAILY
COMMUNITY
Start: 2019-11-19 | End: 2022-06-07 | Stop reason: ALTCHOICE

## 2020-07-27 RX ORDER — FLUTICASONE FUROATE AND VILANTEROL TRIFENATATE 100; 25 UG/1; UG/1
1 POWDER RESPIRATORY (INHALATION) DAILY
COMMUNITY
Start: 2019-09-23 | End: 2020-07-27

## 2020-07-27 RX ORDER — ACETAMINOPHEN 160 MG/5ML
SUSPENSION, ORAL (FINAL DOSE FORM) ORAL
COMMUNITY
Start: 2020-06-29 | End: 2022-06-07 | Stop reason: ALTCHOICE

## 2020-07-27 NOTE — PATIENT INSTRUCTIONS
To break this headache cycle I would like you to stop all over-the-counter medications along with holding on the butalbital 
 
In place I want you to use gabapentin 100 mg 1 to 2 tablets up to 4 times a day for headache Me to see you back in a month your headache should be much improved by the time I see you back When you come back I need you to have a handwritten list of all your accurate medications regarding their names, their dosage, and the time of day that you take them Continue to work with your other providers to keep all your other medical conditions under good control as these can also affect your headaches

## 2020-07-27 NOTE — PROGRESS NOTES
Yuliya Brice is a 48 y.o. female who presents today for the following:  Chief Complaint   Patient presents with    Migraine       This is an in office visit    HPI  Historical Data    Patient previously seen in the practice    Neurologic diagnosis:  Transformed migraines  Quality: Pressure  Associated symptoms: She gets tired; denies light or sound sensitivity or nausea or vomiting  Frequency: 1-2 times a week    Preventative medications  Amitriptyline  Cyclobenzaprine  topamax    Rescue medications  Gabapentin  Fioricet    Interim Data:       Migraine daily x 3 week: using abortive meds daily; she knows she is not allowed to use butalbital on a daily basis so she is supplementing with OTCs. She finds BC is effective but she does not find Tylenol effective  Normally 1-2 x/week: She takes the butalbital and it aborts. BS :  \"very high\";  300s  Working w endocrine   About 2-3 months  HgbA1c: 13    Hysterectomy planned but delayed due to blood sugars  Needing it for frequent heavy bleeding    Sleep apnea: Patient tells me use uses her CPAP machine regularly    In reviewing the patient's medications it is clear she really does not know what she is taking. She tells me she puts everything into a pillbox weekly. But she cannot really clearly confirm what she is or is not taking and at what doses      Allergies   Allergen Reactions    Pollen Extracts Other (comments)     sneezing       Current Outpatient Medications   Medication Sig    aspirin 81 mg chewable tablet Take 81 mg by mouth daily.  Symbicort 160-4.5 mcg/actuation HFAA     cetirizine (ZYRTEC) 10 mg tablet Take 10 mg by mouth daily.  cholecalciferol (VITAMIN D3) (1000 Units /25 mcg) tablet Take 1,000 Units by mouth daily.  insulin lispro (HumaLOG KwikPen Insulin) 100 unit/mL kwikpen 1 Dose by SubCUTAneous route daily.     gabapentin (NEURONTIN) 100 mg capsule 1 to 2 tablets 4 times daily as needed headache    gabapentin (NEURONTIN) 300 mg capsule Take 1 Cap by mouth nightly. Max Daily Amount: 300 mg.  butalbital-acetaminophen-caffeine (FIORICET, ESGIC) -40 mg per tablet Take one to two by mouth on onset of migraine may take and additional pill in 40 min if needed. No more than 3 in a day. Can't use more than 10 days a month    amitriptyline (ELAVIL) 50 mg tablet Take 1 Tab by mouth nightly.  fluticasone propion-salmeterol (ADVAIR DISKUS) 250-50 mcg/dose diskus inhaler Take 1 Puff by inhalation two (2) times a day.  atorvastatin (LIPITOR) 20 mg tablet Take  by mouth daily.  hydroCHLOROthiazide (HYDRODIURIL) 12.5 mg tablet Take 12.5 mg by mouth daily.  insulin aspart U-100 (NOVOLOG FLEXPEN U-100 INSULIN) 100 unit/mL (3 mL) inpn by SubCUTAneous route Before breakfast, lunch, and dinner. Slide and scale    insulin glargine (LANTUS,BASAGLAR) 100 unit/mL (3 mL) inpn 50 Units by SubCUTAneous route nightly.  liraglutide (VICTOZA 2-PILAR) 0.6 mg/0.1 mL (18 mg/3 mL) pnij 0.6 mg by SubCUTAneous route.  albuterol (ACCUNEB) 1.25 mg/3 mL nebu Take 6 mL by inhalation every four (4) hours as needed (wheezing).  metFORMIN (GLUCOPHAGE) 500 mg tablet Take 1,000 mg by mouth two (2) times daily (with meals).  esomeprazole (NEXIUM) 40 mg capsule Take 40 mg by mouth daily.  levothyroxine (SYNTHROID) 25 mcg tablet Take 100 mcg by mouth Daily (before breakfast).  albuterol (VENTOLIN HFA) 90 mcg/actuation inhaler Take 2 Puffs by inhalation every four (4) hours as needed for Wheezing.  Vitamin C 500 mg tablet     OneTouch Verio test strips strip      No current facility-administered medications for this visit. Past Medical History:   Diagnosis Date    Asthma     followed by PCP    Diabetes ([facility] [address]     Hypothyroid     Unspecified sleep apnea     CPAP       Past Surgical History:   Procedure Laterality Date    HX BREAST REDUCTION Bilateral yrs ago    HX CHOLECYSTECTOMY         History reviewed.  No pertinent family history. Social History     Socioeconomic History    Marital status:      Spouse name: Not on file    Number of children: Not on file    Years of education: Not on file    Highest education level: Not on file   Tobacco Use    Smoking status: Never Smoker    Smokeless tobacco: Never Used   Substance and Sexual Activity    Alcohol use: No    Drug use: No         ROS  Other than what is stated above under HPI there is no new or changing issues related to the following:  Constitutional: No recent weight change, fever,fatigue, sleep difficulties, or loss of appetite. ENT/Mouth:  No hearing loss, ringing in the ears, chronic sinus problem, nose bleeds sore throat, voice change, hoarseness, swollen glands in neck, or difficulties with chewing and swallowing. Cardiovascular:  No chest pain/angina pectoris, palpitations,swelling of feet/ankles/hands, or calf pain while walking. Respiratory: No chronic or frequent coughs, spitting up blood, shortness of breath, asthma, or wheezing. Gastrointestinal: No abdominal pain, heartburn, nausea, vomiting, constipation, frequent diarrhea, rectal bleeding, or blood in stool. Genitourinary: No frequent urination, burning or painful urination, blood in urine, incontinence or dribbling. Musculoskeletal:   No joint pain, stiffness/swelling, weakness of muscles, muscle pain/cramp, or back pain. Integument:   No rash/itching, change in skin color, change in hair/nails, or change in color/size of moles. Neurological:  No dizziness/vertigo, loss of consciousness, numbness/tingling sensation, tremors, weakness in limbs, difficulty with balance, frequent or recurring headaches, memory loss or confusion. Psychiatric:   No nervousness, depression, hallucinations, paranoia or suspiciousness. Endocrine: No excessive thirst or urination, heat or cold intolerance. Hematologic/Lymphatic: No bleeding/bruising tendency, phlebitis, or past transfusion. EXAMINATION  Visit Vitals  /60   Pulse 73   Temp 97.3 °F (36.3 °C) (Oral)   Resp 18   Ht 5' 4\" (1.626 m)   Wt 243 lb 3.2 oz (110.3 kg)   SpO2 98%   BMI 41.75 kg/m²            General appearance: Patient is well-developed and well-nourished in no apparent distress and well groomed. Psych/mental health:  Affect: Appropriate    PHQ  3 most recent PHQ Screens 3/17/2020   Little interest or pleasure in doing things Not at all   Feeling down, depressed, irritable, or hopeless Not at all   Total Score PHQ 2 0       HEENT: Normocephalic, without evidence of trauma  Full range of motion, no tenderness to palpation in the head or neck region     Cardiovascular: Extremities warm to touch, no swelling appreciated    Respiratory: No dyspnea on exertion noted, normal effort on casual observation    Musculoskeletal: No evidence of significant bony deformities or spinal curvature    Integumentary:  No obvious bruising, lacerations or discoloaration on casual observation. Neurological Examination:   Mental Status:        MMSE  No flowsheet data found. Formal testing was not completed    there was nothing concerning on general observation and discussion.    Alert oriented and appropriate to general conversation  Normal processing on general observation  Followed conversation and responded seemingly appropriate throughout the office visit  No word finding difficulties noted on casual observation  Able to follow directions without difficulty     Cranial Nerves:    PERRLA,   EOMs intact gaze is conjugate  No nystagmus is appreciated  No LARRY  Facial motor and sensory intact bilaterally  Hearing intact to conversation  Voice with normal projection, no evidence of secretion pooling  Palate elevates symmetrically  Shoulder shrug intact bilaterally  No tongue deviation appreciated     Motor:   Normal tone and bulk  Fine dexterity intact bilaterally  No tremor appreciated on today's exam  No abnormal movements appreciated on today's exam    Muscle strength testing:  Right side  Upper extremities  Deltoid 5/5  Bicep 5/5  Tricep 5/5  Hand grasp 5/5    Lower extremity  Hip flexor 5/5  Extensor 5/5  Flexor 5/5  Plantar flexion 5/5  Dorsiflexion 5/5      Left side  Upper extremity  Deltoid 5/5  Bicep 5/5  Tricep 5/5  Hand grasp 5/5    Lower extremity  Hip flexor 5/5  Extensor 5/5  Flexor 5/5  Plantar flexion 5/5  Dorsiflexion 5/5    Sensation: Intact to light touch bilaterally    Coordination/Cerebellar:   Grossly intact    Gait: Ambulates independently    Fall risk assessment  No flowsheet data found. Reflexes: Not tested    ASSESSMENT AND PLAN  Patient has multiple types of headaches.   At this point she probably started out with migraine status and is now switched over to medication overuse headaches    Additionally her metabolic issues are probably triggering or aggravating her headaches to include uncontrolled blood sugars, and if she is having menorrhagia significant enough to trigger the need for hysterectomy I am sure she is anemic to a certain extent which can also be playing into aggravating her headaches    Patient is to hold all OTCs and butalbital  I am going to place her on gabapentin 100 mg 1 to 2 tablets up to 4 times daily as needed for headaches  At this point I am not sure whether she is or is not taking gabapentin 300 mg at bedtime but if she is she is can continue with that    I have asked her to bring in a handwritten list of all of her active medications, doses, and frequency and timing of her medications so we actually know what she is or is not taking  I do believe she is compliant and adherent to her treatment protocols as prescribed I have encouraged her to continue to use the pillbox but if she cannot articulate to her providers what she is and is not taking this becomes difficult for us to adequately assess the situation and to know exactly what she is taking only what we think she is taking  This is been discussed with the patient today    ICD-10-CM ICD-9-CM    1. Intractable chronic migraine without aura and with status migrainosus  G43.711 346.73 gabapentin (NEURONTIN) 100 mg capsule   2. Bilateral occipital neuralgia  M54.81 723.8    3. New onset of headaches  R51 784.0    4. Tension vascular headache  G44.209 307.81    5. Uncontrolled type 2 diabetes mellitus with hyperglycemia (HCC)  E11.65 250.02    6. Menorrhagia with irregular cycle  N92.1 626.2            Additional pertinent medical data reviewed at today's office visit:         No visits with results within 3 Month(s) from this visit. Latest known visit with results is:   Admission on 02/18/2019, Discharged on 02/20/2019   Component Date Value    Ventricular Rate 02/18/2019 87     Atrial Rate 02/18/2019 87     P-R Interval 02/18/2019 136     QRS Duration 02/18/2019 82     Q-T Interval 02/18/2019 364     QTC Calculation (Bezet) 02/18/2019 438     Calculated P Axis 02/18/2019 20     Calculated R Axis 02/18/2019 -2     Calculated T Axis 02/18/2019 5     Diagnosis 02/18/2019                      Value:Normal sinus rhythm  When compared with ECG of 09-SEP-2014 13:12,  Nonspecific T wave abnormality now evident in Anterior leads  Confirmed by Laura Godfrey ([id number]) on 2/20/2019 1:30:08 PM      WBC 02/18/2019 8.1     RBC 02/18/2019 4.66     HGB 02/18/2019 12.2     HCT 02/18/2019 38.0     MCV 02/18/2019 81.5     MCH 02/18/2019 26.2     MCHC 02/18/2019 32.1     RDW 02/18/2019 14.5     PLATELET 09/63/0699 954     MPV 02/18/2019 10.8     NRBC 02/18/2019 0.0     ABSOLUTE NRBC 02/18/2019 0.00     NEUTROPHILS 02/18/2019 62     LYMPHOCYTES 02/18/2019 28     MONOCYTES 02/18/2019 7     EOSINOPHILS 02/18/2019 2     BASOPHILS 02/18/2019 0     IMMATURE GRANULOCYTES 02/18/2019 1*    ABS. NEUTROPHILS 02/18/2019 5.0     ABS. LYMPHOCYTES 02/18/2019 2.3     ABS. MONOCYTES 02/18/2019 0.6     ABS. EOSINOPHILS 02/18/2019 0.1     ABS.  BASOPHILS 02/18/2019 0.0     ABS. IMM. GRANS. 02/18/2019 0.0     DF 02/18/2019 AUTOMATED     Glucose (POC) 02/18/2019 302*    Performed by 02/18/2019 Ulises Bhat     Lactic Acid (POC) 02/18/2019 1.81     Troponin-I (POC) 02/18/2019 <0.04     Sodium 02/19/2019 132*    Potassium 02/19/2019 4.3     Chloride 02/19/2019 98     CO2 02/19/2019 26     Anion gap 02/19/2019 8     Glucose 02/19/2019 425*    BUN 02/19/2019 15     Creatinine 02/19/2019 1.08*    BUN/Creatinine ratio 02/19/2019 14     GFR est AA 02/19/2019 >60     GFR est non-AA 02/19/2019 54*    Calcium 02/19/2019 8.2*    Glucose (POC) 02/18/2019 459*    Performed by 02/18/2019 Isabel Garcia (PCT)     Glucose (POC) 02/19/2019 412*    Performed by 02/19/2019 Candace Sanderson     Glucose (POC) 02/19/2019 410*    Performed by 02/19/2019 Delgado Brunson (traveler)     Glucose (POC) 02/19/2019 404*    Performed by 02/19/2019 Rosezella Comp (PCT)     Glucose (POC) 02/19/2019 433*    Performed by 02/19/2019 Rosezella Comp (PCT)     Glucose (POC) 02/19/2019 383*    Performed by 02/19/2019 Justen Gibbs     Sodium 02/20/2019 137     Potassium 02/20/2019 3.8     Chloride 02/20/2019 103     CO2 02/20/2019 26     Anion gap 02/20/2019 8     Glucose 02/20/2019 217*    BUN 02/20/2019 20     Creatinine 02/20/2019 0.96     BUN/Creatinine ratio 02/20/2019 21*    GFR est AA 02/20/2019 >60     GFR est non-AA 02/20/2019 >60     Calcium 02/20/2019 8.8     Glucose (POC) 02/20/2019 181*    Performed by 02/20/2019 Cadence Epstein (PCT)        XR Results (maximum last 3): Results from [place] encounter on 02/18/19   XR CHEST PA LAT    Impression IMPRESSION: Normal chest.   Results from [place] encounter on 09/09/14   XR KNEE LT 3 V   XR CHEST PA LAT       CT Results (maximum last 3): Results from [place] encounter on 08/12/19   CT HEAD WO CONT    Impression IMPRESSION: Negative.        MRI Results (maximum last 3):  No results found for this or any previous visit. Follow-up and Dispositions    · Return in about 4 weeks (around 8/24/2020) for In office appointment.            Cynthia Jaramillo MS, ANP-BC, [facility]

## 2020-08-11 DIAGNOSIS — G43.709 TRANSFORMED MIGRAINE WITHOUT AURA: ICD-10-CM

## 2020-08-13 RX ORDER — BUTALBITAL, ACETAMINOPHEN AND CAFFEINE 50; 325; 40 MG/1; MG/1; MG/1
TABLET ORAL
Qty: 30 TAB | Refills: 5 | Status: SHIPPED | OUTPATIENT
Start: 2020-08-13 | End: 2021-04-26

## 2020-09-23 DIAGNOSIS — M54.81 BILATERAL OCCIPITAL NEURALGIA: ICD-10-CM

## 2020-09-24 RX ORDER — GABAPENTIN 300 MG/1
CAPSULE ORAL
Qty: 30 CAP | Refills: 5 | Status: SHIPPED | OUTPATIENT
Start: 2020-09-24 | End: 2021-08-19

## 2020-11-03 DIAGNOSIS — G43.709 TRANSFORMED MIGRAINE WITHOUT AURA: ICD-10-CM

## 2020-11-03 RX ORDER — AMITRIPTYLINE HYDROCHLORIDE 50 MG/1
TABLET, FILM COATED ORAL
Qty: 30 TAB | Refills: 5 | Status: SHIPPED | OUTPATIENT
Start: 2020-11-03 | End: 2021-08-19 | Stop reason: SDUPTHER

## 2021-04-26 DIAGNOSIS — G43.709 TRANSFORMED MIGRAINE WITHOUT AURA: ICD-10-CM

## 2021-04-26 RX ORDER — BUTALBITAL, ACETAMINOPHEN AND CAFFEINE 50; 325; 40 MG/1; MG/1; MG/1
TABLET ORAL
Qty: 30 TAB | Refills: 0 | Status: SHIPPED | OUTPATIENT
Start: 2021-04-26 | End: 2021-05-27

## 2021-05-19 ENCOUNTER — OFFICE VISIT (OUTPATIENT)
Dept: NEUROLOGY | Age: 51
End: 2021-05-19
Payer: COMMERCIAL

## 2021-05-19 VITALS
TEMPERATURE: 98.1 F | HEIGHT: 64 IN | OXYGEN SATURATION: 96 % | BODY MASS INDEX: 41.48 KG/M2 | HEART RATE: 76 BPM | DIASTOLIC BLOOD PRESSURE: 70 MMHG | RESPIRATION RATE: 15 BRPM | WEIGHT: 243 LBS | SYSTOLIC BLOOD PRESSURE: 134 MMHG

## 2021-05-19 DIAGNOSIS — I10 ESSENTIAL HYPERTENSION: ICD-10-CM

## 2021-05-19 DIAGNOSIS — E11.65 UNCONTROLLED TYPE 2 DIABETES MELLITUS WITH HYPERGLYCEMIA (HCC): ICD-10-CM

## 2021-05-19 DIAGNOSIS — G43.711 INTRACTABLE CHRONIC MIGRAINE WITHOUT AURA AND WITH STATUS MIGRAINOSUS: Primary | ICD-10-CM

## 2021-05-19 DIAGNOSIS — E03.9 ACQUIRED HYPOTHYROIDISM: ICD-10-CM

## 2021-05-19 PROCEDURE — 99214 OFFICE O/P EST MOD 30 MIN: CPT | Performed by: PSYCHIATRY & NEUROLOGY

## 2021-05-19 RX ORDER — GALCANEZUMAB 120 MG/ML
240 INJECTION, SOLUTION SUBCUTANEOUS ONCE
Qty: 2 ML | Refills: 0 | Status: SHIPPED | COMMUNITY
Start: 2021-05-19 | End: 2021-05-19

## 2021-05-19 RX ORDER — GALCANEZUMAB 120 MG/ML
120 INJECTION, SOLUTION SUBCUTANEOUS
Qty: 1 ML | Refills: 11 | Status: SHIPPED | OUTPATIENT
Start: 2021-05-19 | End: 2022-06-07 | Stop reason: ALTCHOICE

## 2021-05-19 NOTE — PROGRESS NOTES
Chief Complaint   Patient presents with    Follow-up     patient states that she is having more frequency and had to stay out of work for a whole week.   tanisha went to the doctor and got a shot but it did not do anything to help the headache

## 2021-05-19 NOTE — PROGRESS NOTES
Name: Vale Nava    Chief Complaint: headaches    Continues to have frequent headaches. She is complaint with her medications. Sh has so far failed elavil and topamax. He has failed neurontin as well. Assesment and Plan  1. Transformed migraine without aura  15 migraines a month. Throbbing lasting 4 hours or more. Has suffered migraines for the past 20 years. Failed elavil  Failed topiramate  Failed betablockers (asthmatic)  Trial of emgality first two shots given I the office  Lot [id number]  Exp: 7 2022  Add cyclobenzaprine  Refill neurontin  Continue fioricet as needed    2. Controlled type 2 diabetes mellitus without complication, with long-term current use of insulin ([facility] [address]  Explained the importance of strict sugar control    3. Acquired hypothyroidism  continue levothyroxine    4. Essential hypertension  Continue hctz      Allergies  Pollen extracts     Medications  Current Outpatient Medications   Medication Sig    butalbital-acetaminophen-caffeine (FIORICET, ESGIC) -40 mg per tablet TAKE ONE TO TWO TABLETS BY MOUTH AT ONSET OF MIGRAINE-MAY TAKE AN ADDITIONAL TABLET IN 40 MINUTES IF NEEDED- NO MORE THAN 3 TABS/DAY-USE NO MORE THAN 10 DAYS/MONTH    amitriptyline (ELAVIL) 50 mg tablet Take 1 tablet by mouth nightly     gabapentin (NEURONTIN) 300 mg capsule Take 1 capsule by mouth nightly. Max dose 300mg     Vitamin C 500 mg tablet     aspirin 81 mg chewable tablet Take 81 mg by mouth daily.  OneTouch Verio test strips strip     Symbicort 160-4.5 mcg/actuation HFAA     cetirizine (ZYRTEC) 10 mg tablet Take 10 mg by mouth daily.  cholecalciferol (VITAMIN D3) (1000 Units /25 mcg) tablet Take 1,000 Units by mouth daily.  insulin lispro (HumaLOG KwikPen Insulin) 100 unit/mL kwikpen 1 Dose by SubCUTAneous route daily.     gabapentin (NEURONTIN) 100 mg capsule 1 to 2 tablets 4 times daily as needed headache    fluticasone propion-salmeterol (ADVAIR DISKUS) 250-50 mcg/dose diskus inhaler Take 1 Puff by inhalation two (2) times a day.  atorvastatin (LIPITOR) 20 mg tablet Take  by mouth daily.  hydroCHLOROthiazide (HYDRODIURIL) 12.5 mg tablet Take 12.5 mg by mouth daily.  insulin aspart U-100 (NOVOLOG FLEXPEN U-100 INSULIN) 100 unit/mL (3 mL) inpn by SubCUTAneous route Before breakfast, lunch, and dinner. Slide and scale    insulin glargine (LANTUS,BASAGLAR) 100 unit/mL (3 mL) inpn 50 Units by SubCUTAneous route nightly.  liraglutide (VICTOZA 2-PILAR) 0.6 mg/0.1 mL (18 mg/3 mL) pnij 0.6 mg by SubCUTAneous route.  albuterol (ACCUNEB) 1.25 mg/3 mL nebu Take 6 mL by inhalation every four (4) hours as needed (wheezing).  metFORMIN (GLUCOPHAGE) 500 mg tablet Take 1,000 mg by mouth two (2) times daily (with meals).  esomeprazole (NEXIUM) 40 mg capsule Take 40 mg by mouth daily.  levothyroxine (SYNTHROID) 25 mcg tablet Take 100 mcg by mouth Daily (before breakfast).  albuterol (VENTOLIN HFA) 90 mcg/actuation inhaler Take 2 Puffs by inhalation every four (4) hours as needed for Wheezing. No current facility-administered medications for this visit. Medical History  Past Medical History:   Diagnosis Date    Asthma     followed by PCP    Diabetes ([facility] [address]     Hypothyroid     Unspecified sleep apnea     CPAP     Review of Systems   Constitutional: Negative for chills and fever. HENT: Negative for ear pain. Eyes: Negative for pain and discharge. Respiratory: Positive for wheezing. Negative for cough and hemoptysis. Cardiovascular: Negative for chest pain and claudication. Gastrointestinal: Negative for constipation and diarrhea. Genitourinary: Negative for flank pain and hematuria. Musculoskeletal: Positive for back pain and myalgias. Skin: Negative for itching and rash. Neurological: Positive for headaches. Endo/Heme/Allergies: Negative for environmental allergies. Does not bruise/bleed easily.    Psychiatric/Behavioral: Negative for depression and hallucinations. Exam:  Visit Vitals  /70 (BP 1 Location: Left upper arm, BP Patient Position: Sitting, BP Cuff Size: Adult)   Pulse 76   Temp 98.1 °F (36.7 °C) (Oral)   Resp 15   Ht 5' 4\" (1.626 m)   Wt 243 lb (110.2 kg)   LMP 05/15/2021   SpO2 96%   BMI 41.71 kg/m²      General: Well developed, well nourished. Patient in no apparent distress   Head: Normocephalic, atraumatic, anicteric sclera   Neck Normal ROM, No thyromegally  temdermess left neck no bruit   Lungs:  Normal breath sounds   Cardiac: Regular rate and rhythm   Abd: Bowel sounds were audible. Ext: No pedal edema   Skin: Supple no rash     NeurologicExam:  Mental Status: Alert and oriented to person place and time   Speech: Fluent no aphasia or dysarthria. Cranial Nerves:  II - XII Intact   Motor:  Full and symmetric strength of upper and lower proximal and distal muscles. Normal bulk and tone. Reflexes:   Deep tendon reflexes 2+/4 and symmetric. Sensory:   Symmetric and intact. Gait:  Gait is balanced and fluid with normal arm swing. Tremor:   No tremor noted. Cerebellar:  Coordination intact. Imaging  CT Results (most recent):  Results from Hospital Encounter encounter on 08/12/19    CT HEAD WO CONT    Narrative  EXAM: CT HEAD WO CONT    INDICATION: transformed migraine    COMPARISON: None. CONTRAST: None. TECHNIQUE: Unenhanced CT of the head was performed using 5 mm images. Brain and  bone windows were generated. CT dose reduction was achieved through use of a  standardized protocol tailored for this examination and automatic exposure  control for dose modulation. FINDINGS:  There is no extra-axial fluid collection hemorrhage shift or masses . Impression  IMPRESSION: Negative.       Lab Review  Lab Results   Component Value Date/Time    WBC 8.1 02/18/2019 04:02 PM    HCT 38.0 02/18/2019 04:02 PM    HGB 12.2 02/18/2019 04:02 PM    PLATELET 570 19/48/8703 04:02 PM       Lab Results   Component Value Date/Time    Sodium 137 02/20/2019 01:26 AM    Potassium 3.8 02/20/2019 01:26 AM    Chloride 103 02/20/2019 01:26 AM    CO2 26 02/20/2019 01:26 AM    Glucose 217 (H) 02/20/2019 01:26 AM    BUN 20 02/20/2019 01:26 AM    Creatinine 0.96 02/20/2019 01:26 AM    Calcium 8.8 02/20/2019 01:26 AM

## 2021-05-26 DIAGNOSIS — G43.709 TRANSFORMED MIGRAINE WITHOUT AURA: ICD-10-CM

## 2021-05-27 RX ORDER — BUTALBITAL, ACETAMINOPHEN AND CAFFEINE 50; 325; 40 MG/1; MG/1; MG/1
TABLET ORAL
Qty: 30 TABLET | Refills: 0 | Status: SHIPPED | OUTPATIENT
Start: 2021-05-27 | End: 2021-08-19 | Stop reason: SDUPTHER

## 2021-05-29 DIAGNOSIS — G43.711 INTRACTABLE CHRONIC MIGRAINE WITHOUT AURA AND WITH STATUS MIGRAINOSUS: ICD-10-CM

## 2021-06-01 RX ORDER — GABAPENTIN 100 MG/1
CAPSULE ORAL
Qty: 240 CAPSULE | Refills: 0 | Status: SHIPPED | OUTPATIENT
Start: 2021-06-01 | End: 2021-08-19 | Stop reason: SDUPTHER

## 2021-06-15 ENCOUNTER — TELEPHONE (OUTPATIENT)
Dept: NEUROLOGY | Age: 51
End: 2021-06-15

## 2021-06-15 NOTE — TELEPHONE ENCOUNTER
----- Message from [facility] sent at 6/15/2021  1:03 PM EDT -----  Regarding: /Telephone     Caller's first and last name:Pt  Reason for call:Pa is needed for Emgality  Callback required yes/no and why:Yes  Best contact number(s):[phone]  Details to clarify the request:Pt said [employer] has been reaching out with no response.

## 2021-06-18 NOTE — TELEPHONE ENCOUNTER
Emgality     Loading dose was given in office on 5/19/21. Maintenance dose to begin 6/19/21. Auth obtained from [facility] for 3 month, 6/18/21 - 9/18/21. Will need to renew after 9/18/21. Faxed auth letter to [facility].

## 2021-06-18 NOTE — TELEPHONE ENCOUNTER
Confirmed patient id and advised of approval and that letter of approval has been sent to the [employer]. Received verbal understanding.

## 2021-08-19 ENCOUNTER — OFFICE VISIT (OUTPATIENT)
Dept: NEUROLOGY | Age: 51
End: 2021-08-19
Payer: COMMERCIAL

## 2021-08-19 VITALS
WEIGHT: 250 LBS | OXYGEN SATURATION: 96 % | BODY MASS INDEX: 42.91 KG/M2 | RESPIRATION RATE: 17 BRPM | DIASTOLIC BLOOD PRESSURE: 66 MMHG | HEART RATE: 89 BPM | SYSTOLIC BLOOD PRESSURE: 118 MMHG

## 2021-08-19 DIAGNOSIS — E11.9 CONTROLLED TYPE 2 DIABETES MELLITUS WITHOUT COMPLICATION, WITH LONG-TERM CURRENT USE OF INSULIN (HCC): ICD-10-CM

## 2021-08-19 DIAGNOSIS — Z79.4 CONTROLLED TYPE 2 DIABETES MELLITUS WITHOUT COMPLICATION, WITH LONG-TERM CURRENT USE OF INSULIN (HCC): ICD-10-CM

## 2021-08-19 DIAGNOSIS — G43.711 INTRACTABLE CHRONIC MIGRAINE WITHOUT AURA AND WITH STATUS MIGRAINOSUS: Primary | ICD-10-CM

## 2021-08-19 DIAGNOSIS — M54.81 BILATERAL OCCIPITAL NEURALGIA: ICD-10-CM

## 2021-08-19 DIAGNOSIS — E03.9 ACQUIRED HYPOTHYROIDISM: ICD-10-CM

## 2021-08-19 DIAGNOSIS — G43.709 TRANSFORMED MIGRAINE WITHOUT AURA: ICD-10-CM

## 2021-08-19 DIAGNOSIS — I10 ESSENTIAL HYPERTENSION: ICD-10-CM

## 2021-08-19 PROCEDURE — 99214 OFFICE O/P EST MOD 30 MIN: CPT | Performed by: PSYCHIATRY & NEUROLOGY

## 2021-08-19 RX ORDER — BUTALBITAL, ACETAMINOPHEN AND CAFFEINE 50; 325; 40 MG/1; MG/1; MG/1
TABLET ORAL
Qty: 30 TABLET | Refills: 5 | Status: SHIPPED | OUTPATIENT
Start: 2021-08-19 | End: 2022-05-09 | Stop reason: SDUPTHER

## 2021-08-19 RX ORDER — AMITRIPTYLINE HYDROCHLORIDE 50 MG/1
50 TABLET, FILM COATED ORAL
Qty: 90 TABLET | Refills: 3 | Status: SHIPPED | OUTPATIENT
Start: 2021-08-19 | End: 2022-06-07 | Stop reason: SDUPTHER

## 2021-08-19 RX ORDER — GABAPENTIN 100 MG/1
CAPSULE ORAL
Qty: 240 CAPSULE | Refills: 5 | Status: SHIPPED | OUTPATIENT
Start: 2021-08-19

## 2021-08-19 NOTE — PROGRESS NOTES
Name: Ledy Taylor    Chief Complaint: headaches    Had success with Emgality continues to have morning headaches though. She has not had her CPAP reassessed for several years. Assesment and Plan  1. Transformed migraine without aura  15 migraines a month. Throbbing lasting 4 hours or more. Has suffered migraines for the past 20 years. Failed elavil  Failed topiramate  Failed betablockers (asthmatic)  continue emgality    2. Controlled type 2 diabetes mellitus without complication, with long-term current use of insulin ([address]  Explained the importance of strict sugar control    3. Acquired hypothyroidism  continue levothyroxine    4. Essential hypertension  Continue hctz      Allergies  Pollen extracts     Medications  Current Outpatient Medications   Medication Sig    gabapentin (NEURONTIN) 100 mg capsule TAKE 1 TO 2 CAPSULES BY  MOUTH 4 TIMES DAILY AS NEEDED FOR HEADACHE. TAKE IN ADDITION  MG AT NIGHT.  butalbital-acetaminophen-caffeine (FIORICET, ESGIC) -40 mg per tablet TAKE ONE TO TWO TABLETS BY MOUTH AT ONSET OF MIGRAINE-MAY TAKE AN ADDITIONAL TABLET IN 40 MINUTES IF NEEDED- NO MORE THAN 3 TABS/DAY-USE NO MORE THAN 10 DAYS/MONTH    Emgality Pen 120 mg/mL injection 1 mL by SubCUTAneous route every thirty (30) days.  amitriptyline (ELAVIL) 50 mg tablet Take 1 tablet by mouth nightly     gabapentin (NEURONTIN) 300 mg capsule Take 1 capsule by mouth nightly. Max dose 300mg     Vitamin C 500 mg tablet     aspirin 81 mg chewable tablet Take 81 mg by mouth daily.  OneTouch Verio test strips strip     Symbicort 160-4.5 mcg/actuation HFAA     cetirizine (ZYRTEC) 10 mg tablet Take 10 mg by mouth daily.  cholecalciferol (VITAMIN D3) (1000 Units /25 mcg) tablet Take 1,000 Units by mouth daily.  insulin lispro (HumaLOG KwikPen Insulin) 100 unit/mL kwikpen 1 Dose by SubCUTAneous route daily.     fluticasone propion-salmeterol (ADVAIR DISKUS) 250-50 mcg/dose diskus inhaler Take 1 Puff by inhalation two (2) times a day.  atorvastatin (LIPITOR) 20 mg tablet Take  by mouth daily.  hydroCHLOROthiazide (HYDRODIURIL) 12.5 mg tablet Take 12.5 mg by mouth daily.  insulin aspart U-100 (NOVOLOG FLEXPEN U-100 INSULIN) 100 unit/mL (3 mL) inpn by SubCUTAneous route Before breakfast, lunch, and dinner. Slide and scale    insulin glargine (LANTUS,BASAGLAR) 100 unit/mL (3 mL) inpn 50 Units by SubCUTAneous route nightly.  liraglutide (VICTOZA 2-PILAR) 0.6 mg/0.1 mL (18 mg/3 mL) pnij 0.6 mg by SubCUTAneous route.  albuterol (ACCUNEB) 1.25 mg/3 mL nebu Take 6 mL by inhalation every four (4) hours as needed (wheezing).  metFORMIN (GLUCOPHAGE) 500 mg tablet Take 1,000 mg by mouth two (2) times daily (with meals).  esomeprazole (NEXIUM) 40 mg capsule Take 40 mg by mouth daily.  levothyroxine (SYNTHROID) 25 mcg tablet Take 100 mcg by mouth Daily (before breakfast).  albuterol (VENTOLIN HFA) 90 mcg/actuation inhaler Take 2 Puffs by inhalation every four (4) hours as needed for Wheezing. No current facility-administered medications for this visit. Medical History  Past Medical History:   Diagnosis Date    Asthma     followed by PCP    Diabetes [facility]     Hypothyroid     Unspecified sleep apnea     CPAP     Review of Systems   Constitutional: Negative for chills and fever. Malaise/fatigue:      HENT: Negative for ear pain. Eyes: Negative for pain and discharge. Respiratory: Positive for wheezing. Negative for cough and hemoptysis. Cardiovascular: Negative for chest pain and claudication. Gastrointestinal: Negative for constipation and diarrhea. Genitourinary: Negative for flank pain and hematuria. Musculoskeletal: Positive for back pain and myalgias. Skin: Negative for itching and rash. Neurological: Positive for headaches. Endo/Heme/Allergies: Negative for environmental allergies. Does not bruise/bleed easily.    Psychiatric/Behavioral: Negative for depression and hallucinations. Exam:  Visit Vitals  /66 (BP 1 Location: Left upper arm, BP Patient Position: Sitting, BP Cuff Size: Adult)   Pulse 89   Resp 17   Wt 250 lb (113.4 kg)   SpO2 96%   BMI 42.91 kg/m²      General: Well developed, well nourished. Patient in no apparent distress   Head: Normocephalic, atraumatic, anicteric sclera   Neck Normal ROM, No thyromegally  temdermess left neck no bruit   Lungs:  Normal breath sounds   Cardiac: Regular rate and rhythm   Abd: Bowel sounds were audible. Ext: No pedal edema   Skin: Supple no rash     NeurologicExam:  Mental Status: Alert and oriented to person place and time   Speech: Fluent no aphasia or dysarthria. Cranial Nerves:  II - XII Intact   Motor:  Full and symmetric strength of upper and lower proximal and distal muscles. Normal bulk and tone. Reflexes:   Deep tendon reflexes 2+/4 and symmetric. Sensory:   Symmetric and intact. Gait:  Gait is balanced and fluid with normal arm swing. Tremor:   No tremor noted. Cerebellar:  Coordination intact. Imaging  CT Results (most recent):  Results from Hospital Encounter encounter on 08/12/19    CT HEAD WO CONT    Narrative  EXAM: CT HEAD WO CONT    INDICATION: transformed migraine    COMPARISON: None. CONTRAST: None. TECHNIQUE: Unenhanced CT of the head was performed using 5 mm images. Brain and  bone windows were generated. CT dose reduction was achieved through use of a  standardized protocol tailored for this examination and automatic exposure  control for dose modulation. FINDINGS:  There is no extra-axial fluid collection hemorrhage shift or masses . Impression  IMPRESSION: Negative.       Lab Review  Lab Results   Component Value Date/Time    WBC 8.1 02/18/2019 04:02 PM    HCT 38.0 02/18/2019 04:02 PM    HGB 12.2 02/18/2019 04:02 PM    PLATELET 823 47/86/0252 04:02 PM       Lab Results   Component Value Date/Time    Sodium 137 02/20/2019 01:26 AM    Potassium 3.8 02/20/2019 01:26 AM    Chloride 103 02/20/2019 01:26 AM    CO2 26 02/20/2019 01:26 AM    Glucose 217 (H) 02/20/2019 01:26 AM    BUN 20 02/20/2019 01:26 AM    Creatinine 0.96 02/20/2019 01:26 AM    Calcium 8.8 02/20/2019 01:26 AM

## 2022-03-18 PROBLEM — I10 ESSENTIAL HYPERTENSION: Status: ACTIVE | Noted: 2020-07-27

## 2022-03-18 PROBLEM — E66.01 OBESITY, MORBID (HCC): Status: ACTIVE | Noted: 2019-11-19

## 2022-03-19 PROBLEM — J45.901 ASTHMA EXACERBATION: Status: ACTIVE | Noted: 2019-02-18

## 2022-05-04 DIAGNOSIS — G43.709 TRANSFORMED MIGRAINE WITHOUT AURA: ICD-10-CM

## 2022-05-04 RX ORDER — BUTALBITAL, ACETAMINOPHEN AND CAFFEINE 50; 325; 40 MG/1; MG/1; MG/1
TABLET ORAL
Qty: 30 TABLET | Refills: 1 | OUTPATIENT
Start: 2022-05-04

## 2022-05-09 DIAGNOSIS — G43.709 TRANSFORMED MIGRAINE WITHOUT AURA: ICD-10-CM

## 2022-05-09 RX ORDER — BUTALBITAL, ACETAMINOPHEN AND CAFFEINE 50; 325; 40 MG/1; MG/1; MG/1
TABLET ORAL
Qty: 10 TABLET | Refills: 0 | Status: SHIPPED | OUTPATIENT
Start: 2022-05-09 | End: 2022-06-07 | Stop reason: SDUPTHER

## 2022-05-09 NOTE — TELEPHONE ENCOUNTER
Last office visit with Dorita.   8/19/2021  Next office visit with Nuria Chan  6/7/2022  Last refill 8/19/2021

## 2022-06-07 ENCOUNTER — TELEPHONE (OUTPATIENT)
Dept: NEUROLOGY | Age: 52
End: 2022-06-07

## 2022-06-07 ENCOUNTER — OFFICE VISIT (OUTPATIENT)
Dept: NEUROLOGY | Age: 52
End: 2022-06-07
Payer: COMMERCIAL

## 2022-06-07 VITALS
HEART RATE: 89 BPM | WEIGHT: 267.2 LBS | HEIGHT: 64 IN | DIASTOLIC BLOOD PRESSURE: 68 MMHG | BODY MASS INDEX: 45.62 KG/M2 | SYSTOLIC BLOOD PRESSURE: 116 MMHG | RESPIRATION RATE: 18 BRPM | TEMPERATURE: 97.4 F | OXYGEN SATURATION: 99 %

## 2022-06-07 DIAGNOSIS — E11.9 CONTROLLED TYPE 2 DIABETES MELLITUS WITHOUT COMPLICATION, WITH LONG-TERM CURRENT USE OF INSULIN (HCC): ICD-10-CM

## 2022-06-07 DIAGNOSIS — G43.109 CHRONIC MIGRAINE WITH AURA: Primary | ICD-10-CM

## 2022-06-07 DIAGNOSIS — Z79.4 CONTROLLED TYPE 2 DIABETES MELLITUS WITHOUT COMPLICATION, WITH LONG-TERM CURRENT USE OF INSULIN (HCC): ICD-10-CM

## 2022-06-07 DIAGNOSIS — E66.01 OBESITY, MORBID (HCC): ICD-10-CM

## 2022-06-07 DIAGNOSIS — G47.33 OBSTRUCTIVE SLEEP APNEA SYNDROME: ICD-10-CM

## 2022-06-07 PROCEDURE — 99213 OFFICE O/P EST LOW 20 MIN: CPT | Performed by: NURSE PRACTITIONER

## 2022-06-07 RX ORDER — GALCANEZUMAB 120 MG/ML
120 INJECTION, SOLUTION SUBCUTANEOUS
Qty: 1 ML | Refills: 11 | Status: SHIPPED | OUTPATIENT
Start: 2022-06-07

## 2022-06-07 RX ORDER — PANTOPRAZOLE SODIUM 40 MG/1
40 TABLET, DELAYED RELEASE ORAL DAILY
COMMUNITY
Start: 2022-03-10

## 2022-06-07 RX ORDER — BUTALBITAL, ACETAMINOPHEN AND CAFFEINE 50; 325; 40 MG/1; MG/1; MG/1
TABLET ORAL
Qty: 10 TABLET | Refills: 5 | Status: SHIPPED | OUTPATIENT
Start: 2022-06-07

## 2022-06-07 RX ORDER — TORSEMIDE 20 MG/1
20 TABLET ORAL
COMMUNITY
Start: 2022-01-18

## 2022-06-07 RX ORDER — GALCANEZUMAB 120 MG/ML
240 INJECTION, SOLUTION SUBCUTANEOUS ONCE
Qty: 2 ML | Refills: 0 | Status: SHIPPED | COMMUNITY
Start: 2022-06-07 | End: 2022-06-07

## 2022-06-07 RX ORDER — SUB-Q INSULIN DEVICE, 40 UNIT
EACH MISCELLANEOUS
COMMUNITY
Start: 2022-05-23

## 2022-06-07 RX ORDER — AMITRIPTYLINE HYDROCHLORIDE 50 MG/1
50 TABLET, FILM COATED ORAL
Qty: 90 TABLET | Refills: 3 | Status: SHIPPED | OUTPATIENT
Start: 2022-06-07

## 2022-06-07 NOTE — TELEPHONE ENCOUNTER
VOICEMAIL    . Pt states none of the scripts Randa did today are at the pharmacy. Called pt back to confirm that scripts were supposed to go to [facility] in [place]. Pt confirmed. Pt would like us to call pharmacy to figure out what is going on. Might be PA issues?     Food [facility]ora [phone]  Patient [phone]

## 2022-06-07 NOTE — PROGRESS NOTES
1. Have you been to the ER, urgent care clinic since your last visit? Hospitalized since your last visit? No.    2. Have you seen or consulted any other health care providers outside of the [address] [place] [address] since your last visit? Include any pap smears or colon screening.    No.      Chief Complaint   Patient presents with    Migraine     more migraines more but has been out of medications

## 2022-06-07 NOTE — PROGRESS NOTES
Date:  22    Name:  Omaira Tony  :  1970  MRN:  [id number]       REQUESTING PHYSICIAN:  Annie Vincent NP    Chief Complaint   Patient presents with    Migraine     more migraines more but has been out of medications         HISTORY OF PRESENT ILLNESS:     Patient presents today for Headaches and migraines. Last seen 2021 with Dr Milad Last: She notes that she has been out of the [facility]. Her last injection was 2022, so since then the headaches have resumed, they are terrible and debilitating. Patient is requesting refills of the Emgality, amitriptyline and Fioricet. She is grateful that she has the Fioricet because right now the headaches have been bad, she is trying to limit the [facility] powders. She denies any changes to her headaches, they are presenting some more to in the past.      Headache Characteristics: Throbbing and hurting. Location of the headache:  Front of the head, her eyes hurt her  Frequency: Daily for the last week or so, 15-20 headache days in the last 30 days/month  Length of headache/migraine: From several hours to days  Pain Level:   8/10  Aura:yes will see dots at times  Nausea/Vomiting: yes to nausea, will vomit only when the headaches are extreme. Photophobia: yes  Phonophobia: yes  Provoking factors: not having the medication, being in the sun a lot, allergies  Relieving factors: Tylenol  Focal neurologic symptoms with headache: Not normally. Meds:  Prior abortive tx: See previous notes. Prior preventative tx: See previous notes. Current abortive tx: Fioricet  Current preventative tx: Emgality and amitriptyline    Family Hx of headaches/migraines: daughters have them. Social:  Work: Drives a bus, part time  Caffeine: no soda, drinks a lot of water.   Tobacco use:no, no etoh use  Sleep habits: has sleep apnea, CPAP was recalled, waiting on a new machines, sleep is bad right  Exercise: not normally    Review of Systems   General Constitional ROS:  Notes she is gaining weight. Psychological ROS: denies anxiety/depression c/o sleep issues. ENT ROS: Negative. Endocrine ROS: Negative. Respiratory ROS: c/o SOB. Cardiovascular ROS: Negative  Gastrointestinal ROS: Negative. Genito-Urinary ROS: Negative. Musculoskeletal ROS: C/o some joint pain, c/s injections are helpful. Neurological ROS: C/o headache. C/o dizziness a times, c/o trouble seeing the newspaper, does need to wear reading glasses, but she does not. Current Outpatient Medications   Medication Sig    pantoprazole (PROTONIX) 40 mg tablet Take 40 mg by mouth daily.  V-GO 40 mark Insulin pump    torsemide (DEMADEX) 20 mg tablet Take 20 mg by mouth. On the weekend    butalbital-acetaminophen-caffeine (FIORICET, ESGIC) -40 mg per tablet TAKE ONE TO TWO TABLETS BY MOUTH AT ONSET OF MIGRAINE-MAY TAKE AN ADDITIONAL TABLET IN 40 MINUTES IF NEEDED- NO MORE THAN 3 TABS/DAY-USE NO MORE THAN 10 DAYS/MONTH    gabapentin (NEURONTIN) 100 mg capsule TAKE 1 TO 2 CAPSULES BY  MOUTH 4 TIMES DAILY AS NEEDED FOR HEADACHE. TAKE IN ADDITION  MG AT NIGHT.  amitriptyline (ELAVIL) 50 mg tablet Take 1 Tablet by mouth nightly.  OneTouch Verio test strips strip     cetirizine (ZYRTEC) 10 mg tablet Take 10 mg by mouth daily.  cholecalciferol (VITAMIN D3) (1000 Units /25 mcg) tablet Take 1,000 Units by mouth daily.  fluticasone propion-salmeterol (ADVAIR DISKUS) 250-50 mcg/dose diskus inhaler Take 1 Puff by inhalation two (2) times a day.  atorvastatin (LIPITOR) 20 mg tablet Take  by mouth daily.  hydroCHLOROthiazide (HYDRODIURIL) 12.5 mg tablet Take 12.5 mg by mouth daily.  insulin aspart U-100 (NOVOLOG FLEXPEN U-100 INSULIN) 100 unit/mL (3 mL) inpn by SubCUTAneous route Before breakfast, lunch, and dinner. Slide and scale    liraglutide (VICTOZA 2-PILAR) 0.6 mg/0.1 mL (18 mg/3 mL) pnij 1.8 mg by SubCUTAneous route daily.     albuterol (ACCUNEB) 1.25 mg/3 mL nebu Take 6 mL by inhalation every four (4) hours as needed (wheezing).  levothyroxine (SYNTHROID) 25 mcg tablet Take 100 mcg by mouth Daily (before breakfast).  albuterol (VENTOLIN HFA) 90 mcg/actuation inhaler Take 2 Puffs by inhalation every four (4) hours as needed for Wheezing. No current facility-administered medications for this visit. Allergies   Allergen Reactions    Pollen Extracts Other (comments)     sneezing     Past Medical History:   Diagnosis Date    Asthma     followed by PCP    Diabetes ([facility] [address] [facility]     Hypothyroid     Unspecified sleep apnea     CPAP     Past Surgical History:   Procedure Laterality Date    HX BREAST REDUCTION Bilateral yrs ago    HX CHOLECYSTECTOMY       Social History     Socioeconomic History    Marital status:      Spouse name: Not on file    Number of children: Not on file    Years of education: Not on file    Highest education level: Not on file   Occupational History    Not on file   Tobacco Use    Smoking status: Never Smoker    Smokeless tobacco: Never Used   Vaping Use    Vaping Use: Never used   Substance and Sexual Activity    Alcohol use: No    Drug use: No    Sexual activity: Not on file   Other Topics Concern    Not on file   Social History Narrative    Not on file     Social Determinants of Health     Financial Resource Strain:     Difficulty of Paying Living Expenses: Not on file   Food Insecurity:     Worried About Running Out of Food in the Last Year: Not on file    Torey of Food in the Last Year: Not on file   Transportation Needs:     Lack of Transportation (Medical): Not on file    Lack of Transportation (Non-Medical):  Not on file   Physical Activity:     Days of Exercise per Week: Not on file    Minutes of Exercise per Session: Not on file   Stress:     Feeling of Stress : Not on file   Social Connections:     Frequency of Communication with Friends and Family: Not on file    Frequency of Social Gatherings with Friends and Family: Not on file    Attends [employer] Services: Not on file    Active Member of Clubs or Organizations: Not on file    Attends Club or Organization Meetings: Not on file    Marital Status: Not on file   Intimate Partner Violence:     Fear of Current or Ex-Partner: Not on file    Emotionally Abused: Not on file    Physically Abused: Not on file    Sexually Abused: Not on file   Housing Stability:     Unable to Pay for Housing in the Last Year: Not on file    Number of [place] in the Last Year: Not on file    Unstable Housing in the Last Year: Not on file     No family history on file. PHYSICAL EXAMINATION:    Visit Vitals  Ht 5' 4\" (1.626 m)   Wt 267 lb 3.2 oz (121.2 kg)   BMI 45.86 kg/m²     General:  Well defined, nourished, and groomed individual in no acute distress. Psych:  Good mood and bright affect    NEUROLOGICAL EXAMINATION:     Mental Status:   Alert and oriented to person, place, and time with recent and remote memory intact. Attention span and concentration are normal. Speech is fluent with a full fund of knowledge. Cranial Nerves:    II, III, IV, VI:  Visual acuity grossly intact. Visual fields are normal.    Pupils are equal, round, and reactive to light and accommodation. Extra-ocular movements are full and fluid. No nystagmus. V-XII: Hearing is grossly intact. Facial features are symmetric, with normal sensation and strength. The palate rises symmetrically and the tongue protrudes midline. Sternocleidomastoids 5/5. Motor Examination: Normal tone, 5/5 muscle strength in BUE. Negative pronator drift. No resting or intention tremor. Sensory exam:  Normal sensation to light touch intact in BUE at all dermatomes. Coordination:   Finger to nose and rapid arm movement testing was normal.   Negative Romberg     Gait and Station:  Steady gait. Normal arm swing. Reflexes:  DTRs 2+ throughout. ASSESSMENT AND PLAN    ICD-10-CM ICD-9-CM    1.  Chronic migraine with aura  G43.109 346.00 Emgality Pen 120 mg/mL injection      amitriptyline (ELAVIL) 50 mg tablet      butalbital-acetaminophen-caffeine (FIORICET, ESGIC) -40 mg per tablet      galcanezumab-gnlm (Emgality Pen) 120 mg/mL injection   2. Controlled type 2 diabetes mellitus without complication, with long-term current use of insulin ([facility])  E11.9 250.00     Z79.4 V58.67    3. Obesity, morbid ([address]  E66.01 278.01    4. Obstructive sleep apnea syndrome  G47.33 327.23      1. Chronic migraine with aura: We are providing the patient with loading dose of Emgality, she is to continue the [employer], she denies any side effects or adverse reactions. With the [facility] her migraines are much better controlled, until we are able to get approval patient is to continue with Fioricet, advised to limit the use of this to prevent rebound headache. Continue the amitriptyline at nighttime. Decrease stressors, continue to work on identifying triggers, healthy lifestyle encouraged. Good sleep-wake cycle encouraged as well. -     Emgality Pen 120 mg/mL injection; 1 mL by SubCUTAneous route every thirty (30) days. Indications: migraine prevention, Normal, Disp-1 mL, R-11, CATIA  -     amitriptyline (ELAVIL) 50 mg tablet; Take 1 Tablet by mouth nightly., Normal, Disp-90 Tablet, R-3  -     butalbital-acetaminophen-caffeine (FIORICET, ESGIC) -40 mg per tablet; TAKE ONE TO TWO TABLETS BY MOUTH AT ONSET OF MIGRAINE-MAY TAKE AN ADDITIONAL TABLET IN 40 MINUTES IF NEEDED- NO MORE THAN 3 TABS/DAY-USE NO MORE THAN 10 DAYS/MONTH, Normal, Disp-10 Tablet, R-5  -     galcanezumab-gnlm (Emgality Pen) 120 mg/mL injection; 2 mL by SubCUTAneous route once for 1 dose. Indications: migraine prevention, Sample, Disp-2 mL, R-0  2.  Controlled type 2 diabetes mellitus without complication, with long-term current use of insulin [facility]): Continue with close monitoring from primary care and endocrinology, discussed the benefits of resuming exercise program.  Defer medication management to other providers. 3. Obesity, morbid ([address]): Discussed the benefits of resuming exercise program, weight loss encouraged. 4. Obstructive sleep apnea syndrome: Patient has CPAP machine however it is currently recalled, advised to follow-up with sleep specialist to see when a new machine can be sent to the patient as this could be worsening her headaches and migraines. Patient and/or family verbalized understanding of all instructions. Safety/side effects of medications discussed. The patient is to return in 1 year, sooner if needed. Advised patient to contact me in the next 1 to 2 months to let me know how she is doing with resuming the Emgality.   Donald Barrera, JACKLYN-BC

## 2022-06-08 NOTE — TELEPHONE ENCOUNTER
[facility] to call. [facility] has all Rx now. Spoke with pharmacist at [facility], ph #  [phone]. Verified patient with two patient identifiers. Verified they received all Rx except the Emgality loading dose. Rx is in [facility]. Verified to fill over the phone. Pharmacist verbalized understanding.

## 2022-11-07 DIAGNOSIS — G43.109 CHRONIC MIGRAINE WITH AURA: ICD-10-CM

## 2022-11-08 RX ORDER — BUTALBITAL, ACETAMINOPHEN AND CAFFEINE 50; 325; 40 MG/1; MG/1; MG/1
TABLET ORAL
Qty: 10 TABLET | Refills: 0 | Status: SHIPPED | OUTPATIENT
Start: 2022-11-08

## 2022-12-19 DIAGNOSIS — G43.109 CHRONIC MIGRAINE WITH AURA: ICD-10-CM

## 2022-12-19 RX ORDER — BUTALBITAL, ACETAMINOPHEN AND CAFFEINE 50; 325; 40 MG/1; MG/1; MG/1
TABLET ORAL
Qty: 10 TABLET | Refills: 3 | Status: SHIPPED | OUTPATIENT
Start: 2022-12-19

## 2022-12-19 NOTE — TELEPHONE ENCOUNTER
Requested Prescriptions     Pending Prescriptions Disp Refills    butalbital-acetaminophen-caffeine (FIORICET, ESGIC) -40 mg per tablet 10 Tablet 0     Sig: Take 1 to 2 tablets by mouth at onset of migraine. May take an additional tablet in 40 minutes if needed. No more than 3 tabs per day.  Use no more than 10 days per month     Last fill 11/8/22    Last visit 6/7/22    Next visit 6/8/23

## 2023-03-17 ENCOUNTER — TELEPHONE (OUTPATIENT)
Dept: NEUROLOGY | Age: 53
End: 2023-03-17

## 2023-03-17 NOTE — TELEPHONE ENCOUNTER
Patient called stating that she wants to be seen asap because her headaches are really bad. Please call [phone] as Pt would like to discuss options with Nurse.

## 2023-03-21 NOTE — TELEPHONE ENCOUNTER
Spoke with patient she hasn't used Emgaility since 6/2022 using Fioricet with some relief requesting an appointment to discuss further can we schedule with BRIGETTE Garcia?

## 2023-06-01 RX ORDER — BUTALBITAL, ACETAMINOPHEN AND CAFFEINE 50; 325; 40 MG/1; MG/1; MG/1
TABLET ORAL
Qty: 10 TABLET | Refills: 5 | Status: SHIPPED | OUTPATIENT
Start: 2023-06-01

## 2023-06-01 NOTE — TELEPHONE ENCOUNTER
Requested Prescriptions     Pending Prescriptions Disp Refills    butalbital-acetaminophen-caffeine (FIORICET, ESGIC) -40 MG per tablet 180 tablet      Sig: Take 1 to 2 tablets by mouth at onset of migraine. May take an additional tablet in 40 minutes if needed. No more than 3 tabs per day.  Use no more than 10 days per month    Last fill 04/14/23   Last visit 06/07/22   Next visit  06/26/23

## 2023-06-02 RX ORDER — BUTALBITAL, ACETAMINOPHEN AND CAFFEINE 50; 325; 40 MG/1; MG/1; MG/1
TABLET ORAL
Qty: 10 TABLET | Refills: 0 | OUTPATIENT
Start: 2023-06-02

## 2023-06-09 RX ORDER — AMITRIPTYLINE HYDROCHLORIDE 50 MG/1
TABLET, FILM COATED ORAL
Qty: 90 TABLET | Refills: 0 | OUTPATIENT
Start: 2023-06-09

## 2023-06-26 ENCOUNTER — OFFICE VISIT (OUTPATIENT)
Age: 53
End: 2023-06-26
Payer: COMMERCIAL

## 2023-06-26 VITALS
BODY MASS INDEX: 45.21 KG/M2 | DIASTOLIC BLOOD PRESSURE: 72 MMHG | HEIGHT: 64 IN | RESPIRATION RATE: 18 BRPM | WEIGHT: 264.8 LBS | HEART RATE: 86 BPM | SYSTOLIC BLOOD PRESSURE: 122 MMHG | OXYGEN SATURATION: 93 % | TEMPERATURE: 97.2 F

## 2023-06-26 DIAGNOSIS — G43.109 MIGRAINE WITH AURA, NOT INTRACTABLE, WITHOUT STATUS MIGRAINOSUS: Primary | ICD-10-CM

## 2023-06-26 DIAGNOSIS — R69 MULTIPLE COMORBID CONDITIONS: ICD-10-CM

## 2023-06-26 PROCEDURE — 3074F SYST BP LT 130 MM HG: CPT

## 2023-06-26 PROCEDURE — 99215 OFFICE O/P EST HI 40 MIN: CPT

## 2023-06-26 PROCEDURE — 3078F DIAST BP <80 MM HG: CPT

## 2023-06-26 RX ORDER — AMITRIPTYLINE HYDROCHLORIDE 50 MG/1
50 TABLET, FILM COATED ORAL NIGHTLY
Qty: 90 TABLET | Refills: 3 | Status: SHIPPED | OUTPATIENT
Start: 2023-06-26

## 2023-06-26 RX ORDER — GABAPENTIN 100 MG/1
CAPSULE ORAL
Qty: 60 CAPSULE | Refills: 5 | Status: SHIPPED | OUTPATIENT
Start: 2023-06-26 | End: 2023-12-26

## 2023-06-26 RX ORDER — BUTALBITAL, ACETAMINOPHEN AND CAFFEINE 50; 325; 40 MG/1; MG/1; MG/1
TABLET ORAL
Qty: 10 TABLET | Refills: 6 | Status: SHIPPED | OUTPATIENT
Start: 2023-06-26

## 2023-06-26 ASSESSMENT — ENCOUNTER SYMPTOMS
ALLERGIC/IMMUNOLOGIC NEGATIVE: 1
GASTROINTESTINAL NEGATIVE: 1
SHORTNESS OF BREATH: 1

## 2023-06-26 ASSESSMENT — PATIENT HEALTH QUESTIONNAIRE - PHQ9
2. FEELING DOWN, DEPRESSED OR HOPELESS: 1
SUM OF ALL RESPONSES TO PHQ9 QUESTIONS 1 & 2: 2
1. LITTLE INTEREST OR PLEASURE IN DOING THINGS: 1
SUM OF ALL RESPONSES TO PHQ QUESTIONS 1-9: 2

## 2024-01-05 DIAGNOSIS — G43.109 MIGRAINE WITH AURA, NOT INTRACTABLE, WITHOUT STATUS MIGRAINOSUS: ICD-10-CM

## 2024-01-05 RX ORDER — BUTALBITAL, ACETAMINOPHEN AND CAFFEINE 50; 325; 40 MG/1; MG/1; MG/1
TABLET ORAL
Qty: 10 TABLET | Refills: 6 | Status: SHIPPED | OUTPATIENT
Start: 2024-01-05

## 2024-01-25 ENCOUNTER — TELEPHONE (OUTPATIENT)
Age: 54
End: 2024-01-25

## 2024-07-17 ENCOUNTER — TELEPHONE (OUTPATIENT)
Age: 54
End: 2024-07-17

## 2024-07-17 NOTE — TELEPHONE ENCOUNTER
Patient is now scheduled for 7/31 w/ [facility] [place]. Wants to know if we can refill her medication to last her until the appt. Please advise.

## 2024-07-17 NOTE — TELEPHONE ENCOUNTER
Received fax from [facility] for refill on Butalbital.    Pt last seen 6/26/23 with Tammie Lozada showed appt 3/22/23  No appt scheduled.    Faxed note back stating that pt needs an appt for further refills or send to PCP office . Faxed to pharmacy at [phone] and received fax confirmation.

## 2024-07-18 DIAGNOSIS — G43.109 MIGRAINE WITH AURA, NOT INTRACTABLE, WITHOUT STATUS MIGRAINOSUS: ICD-10-CM

## 2024-07-18 RX ORDER — BUTALBITAL, ACETAMINOPHEN AND CAFFEINE 50; 325; 40 MG/1; MG/1; MG/1
TABLET ORAL
Qty: 10 TABLET | Refills: 0 | Status: SHIPPED | OUTPATIENT
Start: 2024-07-18

## 2024-07-18 NOTE — TELEPHONE ENCOUNTER
Message  Received: Today  Tammie Jacobo, DARLING - Donna Anthony, LPN  Caller: Unspecified (Yesterday,  4:15 PM)  Donna, I have already sent a refill for her till her next appointment.  Thank you        Called pt,verified pt with two pt identifiers, advised pt that Tammie did send in medication until her  appt with us. Pt verbalized understanding and thanked me for call.

## 2024-07-18 NOTE — TELEPHONE ENCOUNTER
Fioricet - needs to keep appt prior to further refills    Seen 6-26-23  Refill 1-5-24  Appt 7-31-24

## 2024-07-30 ASSESSMENT — ENCOUNTER SYMPTOMS
ALLERGIC/IMMUNOLOGIC NEGATIVE: 1
GASTROINTESTINAL NEGATIVE: 1

## 2024-07-31 ENCOUNTER — OFFICE VISIT (OUTPATIENT)
Age: 54
End: 2024-07-31
Payer: COMMERCIAL

## 2024-07-31 VITALS
WEIGHT: 268.4 LBS | HEART RATE: 80 BPM | OXYGEN SATURATION: 97 % | DIASTOLIC BLOOD PRESSURE: 82 MMHG | TEMPERATURE: 97.4 F | RESPIRATION RATE: 19 BRPM | BODY MASS INDEX: 45.82 KG/M2 | SYSTOLIC BLOOD PRESSURE: 136 MMHG | HEIGHT: 64 IN

## 2024-07-31 DIAGNOSIS — Z91.89 STROKE RISK: ICD-10-CM

## 2024-07-31 DIAGNOSIS — G43.109 MIGRAINE WITH AURA, NOT INTRACTABLE, WITHOUT STATUS MIGRAINOSUS: Primary | ICD-10-CM

## 2024-07-31 PROCEDURE — 3079F DIAST BP 80-89 MM HG: CPT

## 2024-07-31 PROCEDURE — 99215 OFFICE O/P EST HI 40 MIN: CPT

## 2024-07-31 PROCEDURE — 3075F SYST BP GE 130 - 139MM HG: CPT

## 2024-07-31 RX ORDER — POTASSIUM CHLORIDE 1500 MG/1
20 TABLET, EXTENDED RELEASE ORAL DAILY
COMMUNITY
Start: 2023-10-18 | End: 2024-10-17

## 2024-07-31 RX ORDER — AMITRIPTYLINE HYDROCHLORIDE 50 MG/1
50 TABLET, FILM COATED ORAL NIGHTLY
Qty: 90 TABLET | Refills: 3 | Status: SHIPPED | OUTPATIENT
Start: 2024-07-31

## 2024-07-31 RX ORDER — MAGNESIUM OXIDE 400 MG/1
400 TABLET ORAL DAILY
COMMUNITY
Start: 2024-05-03 | End: 2025-05-03

## 2024-07-31 RX ORDER — GALCANEZUMAB 120 MG/ML
120 INJECTION, SOLUTION SUBCUTANEOUS
Qty: 1 ADJUSTABLE DOSE PRE-FILLED PEN SYRINGE | Refills: 11 | Status: SHIPPED | OUTPATIENT
Start: 2024-07-31

## 2024-07-31 RX ORDER — SEMAGLUTIDE 2.68 MG/ML
2 INJECTION, SOLUTION SUBCUTANEOUS
COMMUNITY
Start: 2024-06-14

## 2024-07-31 RX ORDER — DULAGLUTIDE 4.5 MG/.5ML
4.5 INJECTION, SOLUTION SUBCUTANEOUS
COMMUNITY
Start: 2023-02-08

## 2024-07-31 RX ORDER — BUTALBITAL, ACETAMINOPHEN AND CAFFEINE 50; 325; 40 MG/1; MG/1; MG/1
TABLET ORAL
Qty: 20 TABLET | Refills: 5 | Status: SHIPPED | OUTPATIENT
Start: 2024-07-31

## 2024-07-31 RX ORDER — GALCANEZUMAB 120 MG/ML
240 INJECTION, SOLUTION SUBCUTANEOUS ONCE
Qty: 2 ML | Refills: 0 | Status: SHIPPED | COMMUNITY
Start: 2024-07-31 | End: 2024-07-31

## 2024-07-31 ASSESSMENT — PATIENT HEALTH QUESTIONNAIRE - PHQ9
SUM OF ALL RESPONSES TO PHQ9 QUESTIONS 1 & 2: 0
1. LITTLE INTEREST OR PLEASURE IN DOING THINGS: NOT AT ALL
SUM OF ALL RESPONSES TO PHQ QUESTIONS 1-9: 0
2. FEELING DOWN, DEPRESSED OR HOPELESS: NOT AT ALL

## 2024-07-31 ASSESSMENT — ENCOUNTER SYMPTOMS
RESPIRATORY NEGATIVE: 1
EYES NEGATIVE: 1

## 2024-07-31 NOTE — ASSESSMENT & PLAN NOTE
Patient is to continue to take her medication as prescribed.     Patient is to continue to follow-up with PCP and other specialists for other conditions as appropriate.

## 2024-07-31 NOTE — ASSESSMENT & PLAN NOTE
Not controlled    Patient is a longer taking Emgality and not sure the last time she actually took it. She has not been taking amitriptyline nightly as prescribed.    Will restart her on Emgality monthly.  2 sample of Emgality were given to her at the office today and she will administer them at home.     As for amitriptyline, we will not make any changes at this time.  She is to continue to take amitriptyline 50 mg at bedtime as additional preventative therapy and Fioricet as needed.  Instruction on how to take medications was reviewed with patient.     She does take gabapentin 100 mg, 1-2 tablets as needed.  However, patient verbalized she was given gabapentin by another provider for back pain and she had enough refills.  She was advised to take the gabapentin 1 hour or 2 before the amitriptyline given her fear of taking them together.    A prescription for Emgality, Fioricet, amitriptyline was sent to patient's pharmacy today.  She may need prior authorization for Emgality given she has not been taking it for several months.    We discussed the importance of a proper diet including plenty of fruits and vegetables as this can help with headache prevention.    We discussed the importance of staying hydrated and drinking at least 32 to 64 ounces of water daily as this can be a cause of tension type headaches.    We also discussed the importance of keeping a headache journal or log to identify triggers.    We discussed the importance of sleep hygiene and attempting to get at least 6 to 8 hours of sleep daily to help with headache prevention.

## 2024-07-31 NOTE — ASSESSMENT & PLAN NOTE
Her stroke risk factors include hypertension, dyslipidemia, diabetes    Patient is to continue to work to all of her comorbid conditions as managed by PCP and other specialists as appropriate    RECOMMENDATIONS:  - BP goal is less than 140/90  - Goal HbA1c is less than 7.   - LDL less than 70     Education was provided today in regards to risk factors for stroke and lifestyle modifications to help minimize the risk of future stroke.    This included medication compliance, regular follow up with primary care physician,  and healthy lifestyle habits (nutrition/exercise).  A copy of the Mediterranean diet was provided to patient today.

## 2024-07-31 NOTE — PATIENT INSTRUCTIONS
As per our discussion,    For your migraine headaches, it looks like you have not been taking your Emgality.  We will start Emgality today by giving you 2 sample while we we will order the Emgality.  It will take a while before your insurance approve.  Once you get approved, you will take only 1 injection a month.    Will continue amitriptyline 50 mg at bedtime and gabapentin as prescribed.  Since you are given gabapentin for your back pain from a different provider, please let us know if you have any additional refills.    Prescription for amitriptyline and Fioricet was sent to your pharmacy.    As we discussed,  The importance of a proper diet including plenty of fruits and vegetables as this can help with headache prevention.  The importance of staying hydrated and drinking at least 32 to 64 ounces of water daily as this can be a cause of tension type headaches.  The importance of keeping a headache journal or log to identify triggers.  The importance of sleep hygiene and attempting to get at least 6 to 8 hours of sleep daily to help with headache prevention.    I highly encourage that you continue to follow up with your primary care provider and your other specialist for t other comorbid conditions as appropriate.    It was a pleasure to see you today    Will see you back in 6 months or sooner if needed    Please do not hesitate to reach out for any questions or concerns.

## 2024-07-31 NOTE — PROGRESS NOTES
Normocephalic  With evidence of trauma: No  Full range of motion head neck: Yes  Tenderness to palpation of the head neck region: No      Cardiovascular:     Extremities warm to touch: Yes  Extremity swelling: No  Discoloration: No  Evidence of PVD: No    Respiratory:   Dyspnea on exertion: No   Abnormal effort on casual observation: No   Use of portable oxygen: No   Evidence of cyanosis: No     Musculoskeletal:   Evidence of significant bone deformities: No   Spinal curvature: No     Integumentary:    Obvious bruising: No   Lacerations or discoloration on casual observation: No       Neurological Examination:   Mental Status:        MMSE  Failed to redirect to the Timeline version of the REVFS SmartLink.     Formal testing was not completed    there was nothing concerning on general observation and discussion.   Alert oriented and appropriate to general conversation  Normal processing on general observation  Followed conversation and responded seemingly appropriate throughout the office visit  No word finding difficulties noted on casual observation  Able to follow directions without difficulty     Cranial Nerves:    EOMs intact gaze is conjugate  No nystagmus is appreciated  Facial motor intact bilaterally  Hearing intact to conversation  Voice with normal projection, no evidence of secretion pooling  Shoulder shrug intact bilaterally  No tongue deviation appreciated     Motor:   Normal bulk  No tremor appreciated on today's exam  No abnormal movements appreciated on today's exam  Moves extremities spontaneously and with purpose  5/5 x 4      Sensation: Intact to light touch    Coordination/Cerebellar: Finger-to-nose intact bilateral    Gait: Ambulates independently    Reflexes: Decreased throughout    Fall risk assessment  Failed to redirect to the Timeline version of the REVFS SmartLink.      Return in about 6 months (around 1/31/2025) for In Office, With me.     This medical record was transcribed using an

## 2025-02-11 DIAGNOSIS — G43.109 MIGRAINE WITH AURA, NOT INTRACTABLE, WITHOUT STATUS MIGRAINOSUS: ICD-10-CM

## 2025-02-11 RX ORDER — BUTALBITAL, ACETAMINOPHEN AND CAFFEINE 50; 325; 40 MG/1; MG/1; MG/1
TABLET ORAL
Qty: 20 TABLET | Refills: 0 | Status: SHIPPED | OUTPATIENT
Start: 2025-02-11

## 2025-02-13 ASSESSMENT — ENCOUNTER SYMPTOMS
ALLERGIC/IMMUNOLOGIC NEGATIVE: 1
RESPIRATORY NEGATIVE: 1
EYES NEGATIVE: 1
GASTROINTESTINAL NEGATIVE: 1

## 2025-02-14 ENCOUNTER — OFFICE VISIT (OUTPATIENT)
Age: 55
End: 2025-02-14
Payer: COMMERCIAL

## 2025-02-14 VITALS
TEMPERATURE: 97.4 F | HEIGHT: 64 IN | HEART RATE: 75 BPM | BODY MASS INDEX: 47.56 KG/M2 | RESPIRATION RATE: 16 BRPM | WEIGHT: 278.6 LBS | DIASTOLIC BLOOD PRESSURE: 72 MMHG | OXYGEN SATURATION: 97 % | SYSTOLIC BLOOD PRESSURE: 138 MMHG

## 2025-02-14 DIAGNOSIS — Z01.01 VISION EXAM WITH ABNORMAL FINDINGS: ICD-10-CM

## 2025-02-14 DIAGNOSIS — G43.109 MIGRAINE WITH AURA, NOT INTRACTABLE, WITHOUT STATUS MIGRAINOSUS: Primary | ICD-10-CM

## 2025-02-14 DIAGNOSIS — G47.33 OBSTRUCTIVE SLEEP APNEA SYNDROME: ICD-10-CM

## 2025-02-14 PROCEDURE — 96372 THER/PROPH/DIAG INJ SC/IM: CPT

## 2025-02-14 PROCEDURE — 99215 OFFICE O/P EST HI 40 MIN: CPT

## 2025-02-14 RX ORDER — KETOROLAC TROMETHAMINE 30 MG/ML
60 INJECTION, SOLUTION INTRAMUSCULAR; INTRAVENOUS ONCE
Status: COMPLETED | OUTPATIENT
Start: 2025-02-14 | End: 2025-02-14

## 2025-02-14 RX ORDER — AMITRIPTYLINE HYDROCHLORIDE 50 MG/1
50 TABLET ORAL NIGHTLY
Qty: 90 TABLET | Refills: 3 | Status: SHIPPED | OUTPATIENT
Start: 2025-02-14

## 2025-02-14 RX ADMIN — KETOROLAC TROMETHAMINE 60 MG: 30 INJECTION, SOLUTION INTRAMUSCULAR at 14:10

## 2025-02-14 ASSESSMENT — PATIENT HEALTH QUESTIONNAIRE - PHQ9
SUM OF ALL RESPONSES TO PHQ QUESTIONS 1-9: 0
SUM OF ALL RESPONSES TO PHQ9 QUESTIONS 1 & 2: 0
2. FEELING DOWN, DEPRESSED OR HOPELESS: NOT AT ALL
1. LITTLE INTEREST OR PLEASURE IN DOING THINGS: NOT AT ALL
SUM OF ALL RESPONSES TO PHQ QUESTIONS 1-9: 0

## 2025-02-14 NOTE — PATIENT INSTRUCTIONS
As per our discussion,    You are already on a good regimen for your migraine headaches.  I will not make any changes at this time.  Will continue Emgality monthly, amitriptyline 50 mg at bedtime, gabapentin 100 mg 1 to 2 tablets as needed.  I would advise taking gabapentin in the afternoon around 5 PM and take the amitriptyline before bedtime to see if that helps.    Given you continue to experience headaches for several days, will administer a Toradol injection in the office today to help break the cycle.    Again, please do not take Tylenol with Fioricet because Fioricet does have Tylenol in it and do not take Tylenol, BC powder, or any other medications over-the-counter no more than 2 to 3 days a week to prevent rebound headaches.    Will refer you to sleep study with Dr. De La Rosa and hopefully you can get an appointment at a sooner than the 1 at [facility].    As we discussed,  The importance of a proper diet including plenty of fruits and vegetables as this can help with headache prevention.  The importance of staying hydrated and drinking at least 32 to 64 ounces of water daily as this can be a cause of tension type headaches.  The importance of keeping a headache journal or log to identify triggers.  The importance of sleep hygiene and attempting to get at least 6 to 8 hours of sleep daily to help with headache prevention.    It was a pleasure to see you today    Will see you back in 4 5 months or sooner if needed    Please do not hesitate to reach out for any questions or concerns

## 2025-02-14 NOTE — PROGRESS NOTES
Timeline version of the REVFS SmartLink.     Formal testing was not completed    there was nothing concerning on general observation and discussion.   Alert oriented and appropriate to general conversation  Normal processing on general observation  Followed conversation and responded seemingly appropriate throughout the office visit  No word finding difficulties noted on casual observation  Able to follow directions without difficulty     Cranial Nerves:    EOMs intact gaze is conjugate  No nystagmus is appreciated  Facial motor intact bilaterally  Hearing intact to conversation  Voice with normal projection, no evidence of secretion pooling  Shoulder shrug intact bilaterally  No tongue deviation appreciated     Motor:   Normal bulk  No tremor appreciated on today's exam  No abnormal movements appreciated on today's exam  Moves extremities spontaneously and with purpose  5/5 x 4      Sensation: Intact to light touch    Coordination/Cerebellar: Finger-to-nose intact bilateral    Gait: Ambulates independently    Reflexes: Decreased throughout    Fall risk assessment  Failed to redirect to the Timeline version of the REVFS SmartLink.      Return for 4-5 months , In Office, With me.     This medical record was transcribed using an electronic medical records system.  Although proofread, it may and can contain electronic and spelling errors.  Other human spelling and other errors may be present.  Corrections may be executed at a later time.  Please feel free to contact us for any clarifications as needed.         DARLING Gaitan - NP   
[FreeTextEntry1] : GAIT: +intoeing noted left. Good coordination and balance. No falls in the office GENERAL: alert, cooperative pleasant young 3 yo female in NAD SKIN: The skin is intact, warm, pink and dry over the area examined. EYES: Normal conjunctiva, normal eyelids and pupils were equal and round. ENT: normal ears, normal nose and normal lips. CARDIOVASCULAR: brisk capillary refill, but no peripheral edema. RESPIRATORY: The patient is in no apparent respiratory distress. They're taking full deep breaths without use of accessory muscles or evidence of audible wheezes or stridor without the use of a stethoscope. Normal respiratory effort. ABDOMEN: not examined   SPINE no evidence of asymmetry. no tenderness to palpation LOWER extremity: Neutral alignment of the lower extremities. Approx 1cm LLD noted left shorter than right. foot size left slightly smaller.  Hips full flexion and extension. Wide symmetrical abduction. Neg galleazzi. Symmetrical IR 75. Knee: full flexion and extension. No effusion. No tenderness to palpation. No instability to stress PA: 10 degrees TFA  neutral. Ankle/foot: arch present at rest. No callouses on the feet. left ankle tightness noted. The Achilles on the left is tighter than the right and there appears to be overpull of the tibilais anterior, slight varus on the left. Neutral right. No clawing of the toes Motor strenth5/5, sensation grossly intact, brisk cap refill Reflexes symmetrical . Neg babinski, neg clonus 
Admission

## 2025-02-15 PROBLEM — G47.33 OBSTRUCTIVE SLEEP APNEA SYNDROME: Status: ACTIVE | Noted: 2025-02-15

## 2025-02-15 PROBLEM — Z01.01 VISION EXAM WITH ABNORMAL FINDINGS: Status: ACTIVE | Noted: 2025-02-15

## 2025-02-15 NOTE — ASSESSMENT & PLAN NOTE
Not well managed.    This has been monitored by specialist at [facility], however, patient is not able to be seen until 6 to 8 months from now.    Will refer her to sleep medicine with Dr. De La Rosa for evaluation and hopefully she can get some help adjusting her CPAP machine.    Sleep hygiene was discussed.

## 2025-02-15 NOTE — ASSESSMENT & PLAN NOTE
Not well controlled    Factors that may contribute to her symptoms include untreated sleep apnea.    Will refer her to sleep medicine for evaluation and adjustment of her CPAP machine.    She is to continue on Emgality monthly and Fioricet as needed.    She was encouraged to try amitriptyline at bedtime for additional preventative therapy which would help with sleep as well.  Side effects were reviewed.    For her current migraine headache, will administer Toradol 60 mg IM in the office today to help break the migraine cycle.  Patient was not a candidate for Medrol Dosepak due to uncontrolled diabetes.      We discussed the importance of a proper diet including plenty of fruits and vegetables as this can help with headache prevention.    We discussed the importance of staying hydrated and drinking at least 32 to 64 ounces of water daily as this can be a cause of tension type headaches.    We also discussed the importance of keeping a headache journal or log to identify triggers.    We discussed the importance of sleep hygiene and attempting to get at least 6 to 8 hours of sleep daily to help with headache prevention.

## 2025-02-16 NOTE — ASSESSMENT & PLAN NOTE
Patient reported possible bleeding behind her eyes on her last ophthalmology exam.    Her current neurological examination revealed no visual disturbance.    This has been monitored by specialist- no acute findings meriting change in the plan.    She is to continue to follow-up with ophthalmology as appropriate.

## 2025-02-21 DIAGNOSIS — G43.109 MIGRAINE WITH AURA, NOT INTRACTABLE, WITHOUT STATUS MIGRAINOSUS: ICD-10-CM

## 2025-02-21 RX ORDER — GABAPENTIN 100 MG/1
CAPSULE ORAL
Qty: 60 CAPSULE | Refills: 5 | Status: SHIPPED | OUTPATIENT
Start: 2025-02-21 | End: 2026-10-20

## 2025-03-20 DIAGNOSIS — G43.109 MIGRAINE WITH AURA, NOT INTRACTABLE, WITHOUT STATUS MIGRAINOSUS: ICD-10-CM

## 2025-03-20 RX ORDER — BUTALBITAL, ACETAMINOPHEN AND CAFFEINE 50; 325; 40 MG/1; MG/1; MG/1
TABLET ORAL
Qty: 20 TABLET | Refills: 1 | Status: SHIPPED | OUTPATIENT
Start: 2025-03-20

## 2025-04-29 ENCOUNTER — CLINICAL DOCUMENTATION (OUTPATIENT)
Age: 55
End: 2025-04-29

## 2025-04-29 ENCOUNTER — OFFICE VISIT (OUTPATIENT)
Age: 55
End: 2025-04-29
Payer: COMMERCIAL

## 2025-04-29 VITALS
BODY MASS INDEX: 47.62 KG/M2 | HEART RATE: 85 BPM | HEIGHT: 64 IN | WEIGHT: 278.9 LBS | TEMPERATURE: 97.9 F | SYSTOLIC BLOOD PRESSURE: 128 MMHG | DIASTOLIC BLOOD PRESSURE: 75 MMHG | OXYGEN SATURATION: 97 %

## 2025-04-29 DIAGNOSIS — I10 PRIMARY HYPERTENSION: ICD-10-CM

## 2025-04-29 DIAGNOSIS — G47.10 HYPERSOMNIA WITH SLEEP APNEA: Primary | ICD-10-CM

## 2025-04-29 DIAGNOSIS — E66.2 HYPOVENTILATION ASSOCIATED WITH OBESITY SYNDROME (HCC): ICD-10-CM

## 2025-04-29 DIAGNOSIS — G47.30 HYPERSOMNIA WITH SLEEP APNEA: Primary | ICD-10-CM

## 2025-04-29 PROCEDURE — 3078F DIAST BP <80 MM HG: CPT | Performed by: INTERNAL MEDICINE

## 2025-04-29 PROCEDURE — 3074F SYST BP LT 130 MM HG: CPT | Performed by: INTERNAL MEDICINE

## 2025-04-29 PROCEDURE — 99204 OFFICE O/P NEW MOD 45 MIN: CPT | Performed by: INTERNAL MEDICINE

## 2025-04-29 RX ORDER — GUAIFENESIN 600 MG/1
600 TABLET, EXTENDED RELEASE ORAL 2 TIMES DAILY
COMMUNITY
Start: 2025-04-25 | End: 2026-04-25

## 2025-04-29 RX ORDER — ACYCLOVIR 400 MG/1
TABLET ORAL
COMMUNITY
Start: 2025-03-10

## 2025-04-29 RX ORDER — FLUTICASONE PROPIONATE 50 MCG
SPRAY, SUSPENSION (ML) NASAL
COMMUNITY
Start: 2025-04-01

## 2025-04-29 RX ORDER — PANTOPRAZOLE SODIUM 40 MG/1
TABLET, DELAYED RELEASE ORAL
COMMUNITY
Start: 2025-04-28

## 2025-04-29 RX ORDER — BUDESONIDE AND FORMOTEROL FUMARATE DIHYDRATE 160; 4.5 UG/1; UG/1
AEROSOL RESPIRATORY (INHALATION)
COMMUNITY
Start: 2025-04-25

## 2025-04-29 RX ORDER — MODAFINIL 200 MG/1
200 TABLET ORAL DAILY
Qty: 30 TABLET | Refills: 5 | Status: SHIPPED | OUTPATIENT
Start: 2025-04-29 | End: 2025-10-26

## 2025-04-29 ASSESSMENT — SLEEP AND FATIGUE QUESTIONNAIRES
HOW LIKELY ARE YOU TO NOD OFF OR FALL ASLEEP IN A CAR, WHILE STOPPED FOR A FEW MINUTES IN TRAFFIC: WOULD NEVER DOZE
HOW LIKELY ARE YOU TO NOD OFF OR FALL ASLEEP WHILE WATCHING TV: MODERATE CHANCE OF DOZING
ESS TOTAL SCORE: 5
HOW LIKELY ARE YOU TO NOD OFF OR FALL ASLEEP WHILE SITTING QUIETLY AFTER LUNCH WITHOUT ALCOHOL: WOULD NEVER DOZE
HOW LIKELY ARE YOU TO NOD OFF OR FALL ASLEEP WHEN YOU ARE A PASSENGER IN A CAR FOR AN HOUR WITHOUT A BREAK: SLIGHT CHANCE OF DOZING
HOW LIKELY ARE YOU TO NOD OFF OR FALL ASLEEP WHILE SITTING AND TALKING TO SOMEONE: WOULD NEVER DOZE
HOW LIKELY ARE YOU TO NOD OFF OR FALL ASLEEP WHILE SITTING INACTIVE IN A PUBLIC PLACE: SLIGHT CHANCE OF DOZING
HOW LIKELY ARE YOU TO NOD OFF OR FALL ASLEEP WHILE LYING DOWN TO REST IN THE AFTERNOON WHEN CIRCUMSTANCES PERMIT: SLIGHT CHANCE OF DOZING
HOW LIKELY ARE YOU TO NOD OFF OR FALL ASLEEP WHILE SITTING AND READING: WOULD NEVER DOZE

## 2025-04-29 NOTE — PROGRESS NOTES
SYMBICORT 160-4.5 MCG/ACT AERO, , Disp: , Rfl:     fluticasone (FLONASE) 50 MCG/ACT nasal spray, SPRAY 1 SPRAY INTO EACH NOSTIL ONCE DAILY, Disp: , Rfl:     guaiFENesin (MUCINEX) 600 MG extended release tablet, Take 1 tablet by mouth 2 times daily, Disp: , Rfl:     pantoprazole (PROTONIX) 40 MG tablet, , Disp: , Rfl:     modafinil (PROVIGIL) 200 MG tablet, Take 1 tablet by mouth daily for 180 days. Max Daily Amount: 200 mg, Disp: 30 tablet, Rfl: 5    butalbital-acetaminophen-caffeine (FIORICET, ESGIC) -40 MG per tablet, Take 1 to 2 tablets by mouth at onset of migraine. May take an additional tablet in 40 minutes if needed. No more than 3 tabs per day. Use no more than 10 days per month, Disp: 20 tablet, Rfl: 1    gabapentin (NEURONTIN) 100 MG capsule, TAKE 1 TO 2 CAPSULES BY  MOUTH 2 TIMES DAILY AS NEEDED FOR HEADACHE., Disp: 60 capsule, Rfl: 5    amitriptyline (ELAVIL) 50 MG tablet, Take 1 tablet by mouth nightly, Disp: 90 tablet, Rfl: 3    Dulaglutide (TRULICITY) 4.5 MG/0.5ML SOPN, 4.5 mg, Disp: , Rfl:     magnesium oxide (MAG-OX) 400 MG tablet, Take 1 tablet by mouth daily, Disp: , Rfl:     potassium chloride (K-TAB) 20 MEQ TBCR extended release tablet, Take 1 tablet by mouth daily, Disp: , Rfl:     OZEMPIC, 2 MG/DOSE, 8 MG/3ML SOPN sc injection, Inject 2 mg into the skin every 7 days, Disp: , Rfl:     Galcanezumab-gnlm (EMGALITY) 120 MG/ML SOAJ, Inject 120 mg into the skin every 30 days, Disp: 1 Adjustable Dose Pre-filled Pen Syringe, Rfl: 11    albuterol (ACCUNEB) 1.25 MG/3ML nebulizer solution, Inhale 6 mLs into the lungs every 4 hours as needed, Disp: , Rfl:     albuterol sulfate HFA (PROVENTIL;VENTOLIN;PROAIR) 108 (90 Base) MCG/ACT inhaler, Inhale 2 puffs into the lungs every 4 hours as needed, Disp: , Rfl:     atorvastatin (LIPITOR) 20 MG tablet, Take by mouth daily, Disp: , Rfl:     cetirizine (ZYRTEC) 10 MG tablet, Take 1 tablet by mouth daily, Disp: , Rfl:     fluticasone-salmeterol (ADVAIR)

## 2025-04-29 NOTE — PATIENT INSTRUCTIONS
[address]  [place] [address]  Tel.  [phone]  Fax. [phone] [address]  [place] [address]  Tel.  [phone]  Fax. [phone] [address] [place] [address]  Tel.  [phone]  Fax. [phone]     Learning About CPAP for Sleep Apnea  What is CPAP?              CPAP is a small machine that you use at home every night while you sleep. It increases air pressure in your throat to keep your airway open. When you have sleep apnea, this can help you sleep better so you feel much better. CPAP stands for \"continuous positive airway pressure.\"  The CPAP machine will have one of the following:  A mask that covers your nose and mouth  Prongs that fit into your nose  A mask that covers your nose only, the most common type. This type is called NCPAP. The N stands for \"nasal.\"  Why is it done?  CPAP is usually the best treatment for obstructive sleep apnea. It is the first treatment choice and the most widely used. Your doctor may suggest CPAP if you have:  Moderate to severe sleep apnea.  Sleep apnea and coronary artery disease (CAD) or heart failure.  How does it help?  CPAP can help you have more normal sleep, so you feel less sleepy and more alert during the daytime.  CPAP may help keep heart failure or other heart problems from getting worse.  NCPAP may help lower your blood pressure.  If you use CPAP, your bed partner may also sleep better because you are not snoring or restless.  What are the side effects?  Some people who use CPAP have:  A dry or stuffy nose and a sore throat.  Irritated skin on the face.  Sore eyes.  Bloating.  If you have any of these problems, work with your doctor to fix them. Here are some things you can try:  Be sure the mask or nasal prongs fit well.  See if your doctor can adjust the pressure of your CPAP.  If your nose is dry, try a humidifier.  If your nose is runny or stuffy, try decongestant medicine or a steroid

## 2025-04-29 NOTE — PROGRESS NOTES
[employer] Modafinil 200MG Tablet Prior Authorization Initiated and Submitted via Cover-My-Meds.    Cover-My-Meds Key Code: [id number]    PA Case ID #: [id number]    Rx #: [id number]

## 2025-04-30 ENCOUNTER — CLINICAL DOCUMENTATION (OUTPATIENT)
Age: 55
End: 2025-04-30

## 2025-04-30 NOTE — PROGRESS NOTES
[employer] Modafinil 200MG Tablet Prior Authorization Approved    Cover-My-Meds Key Code: [id number]     PA Case ID #: [id number]     Rx #: [id number]    Approval Dates: 4/29/2025 - 4/29/2026    Approval letter scanned into patient media

## 2025-05-30 ENCOUNTER — TELEPHONE (OUTPATIENT)
Age: 55
End: 2025-05-30

## 2025-05-30 NOTE — TELEPHONE ENCOUNTER
Patient's sleep test didn't land in our Prior [facility] teams work queue. Reason is being investigated. Prior authorization not yet initiated and takes up to 10 business days to return.     Left voicemail to reschedule patient for 6/22/25. Wanted to keep patient on the schedule so the team would continue to work her case now that she is in their queue.     Asked her to return our call to confirm she got our message or reschedule if June 22nd conflicts.

## 2025-06-22 ENCOUNTER — HOSPITAL ENCOUNTER (OUTPATIENT)
Facility: HOSPITAL | Age: 55
Discharge: HOME | End: 2025-06-25
Attending: INTERNAL MEDICINE
Payer: COMMERCIAL

## 2025-06-22 VITALS
SYSTOLIC BLOOD PRESSURE: 111 MMHG | HEIGHT: 64 IN | HEART RATE: 93 BPM | TEMPERATURE: 97.9 F | BODY MASS INDEX: 46.35 KG/M2 | DIASTOLIC BLOOD PRESSURE: 67 MMHG | WEIGHT: 271.5 LBS

## 2025-06-22 DIAGNOSIS — E66.2 HYPOVENTILATION ASSOCIATED WITH OBESITY SYNDROME (HCC): ICD-10-CM

## 2025-06-22 DIAGNOSIS — G47.10 HYPERSOMNIA WITH SLEEP APNEA: ICD-10-CM

## 2025-06-22 DIAGNOSIS — G47.30 HYPERSOMNIA WITH SLEEP APNEA: ICD-10-CM

## 2025-06-22 PROCEDURE — 95811 POLYSOM 6/>YRS CPAP 4/> PARM: CPT | Performed by: INTERNAL MEDICINE

## 2025-06-23 DIAGNOSIS — G43.109 MIGRAINE WITH AURA, NOT INTRACTABLE, WITHOUT STATUS MIGRAINOSUS: ICD-10-CM

## 2025-06-23 NOTE — TELEPHONE ENCOUNTER
The patient is requesting refills on butalbital-acetaminophen-caffine. Confirmed [facility] on [facility].

## 2025-06-24 RX ORDER — BUTALBITAL, ACETAMINOPHEN AND CAFFEINE 50; 325; 40 MG/1; MG/1; MG/1
TABLET ORAL
Qty: 20 TABLET | Refills: 1 | Status: SHIPPED | OUTPATIENT
Start: 2025-06-24

## 2025-06-28 ENCOUNTER — CLINICAL DOCUMENTATION (OUTPATIENT)
Age: 55
End: 2025-06-28

## 2025-06-28 DIAGNOSIS — G47.33 OSA (OBSTRUCTIVE SLEEP APNEA): Primary | ICD-10-CM

## 2025-06-28 NOTE — PROGRESS NOTES
Polysomnography with PAP titration interpreted, device ordered.    Encounter Diagnosis   Name Primary?    VIDHI (obstructive sleep apnea) Yes         Orders Placed This Encounter   Procedures    DME Order for (Specify) as OP     - DME device ordered - ResMed Device with Heated Humidifer  / .   - Diagnosis: Obstructive Sleep Apnea (G47.33)  - Length of Need: Lifetime      ResMed VPAP Auto (VAuto Mode): Maximum IPAP: 20 cmH2O; Minimum EPAP: 8 cmH2O; PS: 04 cmH2O.  Ramp Time: 30 Minutes.     CPAP mask -  As fitted during titration OR patient preference, headgear, tubing, and filter;  heated humidifier; wireless modem. Remote monitoring enrollment.    Carlos Tsyon MD, FAASM; NPI: [id number]  6/28/2025

## 2025-06-30 ENCOUNTER — CLINICAL DOCUMENTATION (OUTPATIENT)
Age: 55
End: 2025-06-30

## 2025-07-14 ENCOUNTER — OFFICE VISIT (OUTPATIENT)
Age: 55
End: 2025-07-14
Payer: COMMERCIAL

## 2025-07-14 ENCOUNTER — TELEPHONE (OUTPATIENT)
Age: 55
End: 2025-07-14

## 2025-07-14 VITALS
HEART RATE: 91 BPM | SYSTOLIC BLOOD PRESSURE: 104 MMHG | DIASTOLIC BLOOD PRESSURE: 62 MMHG | WEIGHT: 276 LBS | HEIGHT: 64 IN | OXYGEN SATURATION: 95 % | RESPIRATION RATE: 16 BRPM | BODY MASS INDEX: 47.12 KG/M2

## 2025-07-14 DIAGNOSIS — R52 BODY ACHES: ICD-10-CM

## 2025-07-14 DIAGNOSIS — G43.109 MIGRAINE WITH AURA, NOT INTRACTABLE, WITHOUT STATUS MIGRAINOSUS: Primary | ICD-10-CM

## 2025-07-14 DIAGNOSIS — G47.33 OSA (OBSTRUCTIVE SLEEP APNEA): Primary | ICD-10-CM

## 2025-07-14 DIAGNOSIS — R53.83 FATIGUE, UNSPECIFIED TYPE: ICD-10-CM

## 2025-07-14 DIAGNOSIS — G47.33 OBSTRUCTIVE SLEEP APNEA SYNDROME: ICD-10-CM

## 2025-07-14 PROCEDURE — 99215 OFFICE O/P EST HI 40 MIN: CPT

## 2025-07-14 PROCEDURE — 3078F DIAST BP <80 MM HG: CPT

## 2025-07-14 PROCEDURE — 3074F SYST BP LT 130 MM HG: CPT

## 2025-07-14 RX ORDER — TIRZEPATIDE 12.5 MG/.5ML
INJECTION, SOLUTION SUBCUTANEOUS WEEKLY
COMMUNITY
Start: 2025-07-08

## 2025-07-14 RX ORDER — LIDOCAINE 50 MG/G
1 PATCH TOPICAL EVERY 24 HOURS
COMMUNITY
Start: 2025-04-25

## 2025-07-14 RX ORDER — TRAMADOL HYDROCHLORIDE 50 MG/1
50 TABLET ORAL EVERY 8 HOURS PRN
COMMUNITY
Start: 2025-05-12

## 2025-07-14 RX ORDER — BUTALBITAL, ACETAMINOPHEN AND CAFFEINE 50; 325; 40 MG/1; MG/1; MG/1
TABLET ORAL
Qty: 20 TABLET | Refills: 5 | Status: SHIPPED | OUTPATIENT
Start: 2025-07-14

## 2025-07-14 RX ORDER — AMITRIPTYLINE HYDROCHLORIDE 50 MG/1
50 TABLET ORAL NIGHTLY
Qty: 90 TABLET | Refills: 3 | Status: SHIPPED | OUTPATIENT
Start: 2025-07-14

## 2025-07-14 RX ORDER — SACUBITRIL AND VALSARTAN 24; 26 MG/1; MG/1
1 TABLET, FILM COATED ORAL 2 TIMES DAILY
COMMUNITY
Start: 2025-05-13 | End: 2026-05-13

## 2025-07-14 RX ORDER — GALCANEZUMAB 120 MG/ML
120 INJECTION, SOLUTION SUBCUTANEOUS
Qty: 1 ADJUSTABLE DOSE PRE-FILLED PEN SYRINGE | Refills: 11 | Status: ACTIVE | OUTPATIENT
Start: 2025-07-14

## 2025-07-14 RX ORDER — GABAPENTIN 100 MG/1
CAPSULE ORAL
Qty: 60 CAPSULE | Refills: 5 | Status: SHIPPED | OUTPATIENT
Start: 2025-07-14 | End: 2027-03-12

## 2025-07-14 ASSESSMENT — ENCOUNTER SYMPTOMS
GASTROINTESTINAL NEGATIVE: 1
ALLERGIC/IMMUNOLOGIC NEGATIVE: 1
RESPIRATORY NEGATIVE: 1
EYES NEGATIVE: 1

## 2025-07-14 ASSESSMENT — PATIENT HEALTH QUESTIONNAIRE - PHQ9
SUM OF ALL RESPONSES TO PHQ QUESTIONS 1-9: 0
SUM OF ALL RESPONSES TO PHQ QUESTIONS 1-9: 0
2. FEELING DOWN, DEPRESSED OR HOPELESS: NOT AT ALL
SUM OF ALL RESPONSES TO PHQ QUESTIONS 1-9: 0
SUM OF ALL RESPONSES TO PHQ QUESTIONS 1-9: 0
1. LITTLE INTEREST OR PLEASURE IN DOING THINGS: NOT AT ALL

## 2025-07-14 NOTE — PROGRESS NOTES
1. Have you been to the ER, urgent care clinic since your last visit?  Hospitalized since your last visit?  No.    2. Have you seen or consulted any other health care providers outside of the [facility] since your last visit?  Include any pap smears or colon screening.   No.      Chief Complaint   Patient presents with    Migraine     Pt denies any new symptoms- headaches more frequent        
Pollen(K-O-R-T-Swt Ney) Other (See Comments) and Itching     Reaction Type: Allergy; Reaction(s): Nasal congestion,watery eyes    Metformin Diarrhea        Current Outpatient Medications   Medication Sig    diclofenac sodium (VOLTAREN) 1 % GEL Apply 2 g topically 4 times daily as needed    empagliflozin (JARDIANCE) 10 MG tablet Take 1 tablet by mouth daily    lidocaine (LIDODERM) 5 % Place 1 patch onto the skin every 24 hours    sacubitril-valsartan (ENTRESTO) 24-26 MG per tablet Take 1 tablet by mouth 2 times daily    MOUNJARO 12.5 MG/0.5ML SOAJ injection once a week    traMADol (ULTRAM) 50 MG tablet Take 1 tablet by mouth every 8 hours as needed.    butalbital-acetaminophen-caffeine (FIORICET, ESGIC) -40 MG per tablet Take 1 to 2 tablets by mouth at onset of migraine. May take an additional tablet in 40 minutes if needed. No more than 3 tabs per day. Use no more than 10 days per month    gabapentin (NEURONTIN) 100 MG capsule TAKE 1 TO 2 CAPSULES BY  MOUTH 2 TIMES DAILY AS NEEDED FOR HEADACHE.    amitriptyline (ELAVIL) 50 MG tablet Take 1 tablet by mouth nightly    Galcanezumab-gnlm (EMGALITY) 120 MG/ML SOAJ Inject 120 mg into the skin every 30 days    Continuous Glucose  (DEXCOM G7 ) JANAE USE WITH SENSOR TO MONITOR BLOOD GLUCOSE CONTINUOUSLY    SYMBICORT 160-4.5 MCG/ACT AERO Inhale 2 puffs into the lungs 2 times daily    pantoprazole (PROTONIX) 40 MG tablet Take 1 tablet by mouth daily    potassium chloride (K-TAB) 20 MEQ TBCR extended release tablet Take 1 tablet by mouth daily    albuterol (ACCUNEB) 1.25 MG/3ML nebulizer solution Inhale 6 mLs into the lungs every 4 hours as needed    albuterol sulfate HFA (PROVENTIL;VENTOLIN;PROAIR) 108 (90 Base) MCG/ACT inhaler Inhale 2 puffs into the lungs every 4 hours as needed    atorvastatin (LIPITOR) 20 MG tablet Take by mouth daily    cetirizine (ZYRTEC) 10 MG tablet Take 1 tablet by mouth daily    fluticasone-salmeterol (ADVAIR) 250-50 MCG/ACT

## 2025-07-14 NOTE — PATIENT INSTRUCTIONS
As per our discussion,    One of the factors that may contribute to your headaches include sleep difficulty.    I would encourage that you continue to follow with sleep study today before you go home to discuss this.    For your migraine headaches, we will continue Emgality monthly, amitriptyline for 50 mg daily, and Fioricet and gabapentin as needed.  I would encourage that you keep in mind that taking amitriptyline and gabapentin together can make you feel drowsy.    I encourage that you continue to follow-up with your primary care provider and your other specialist for other comorbid condition as appropriate.    Take your time when you are walking or when you are switching position to prevent falls.    It was a pleasure to see you today    Will see you back in 6 months or sooner if needed    Please do not hesitate to reach out for any questions or concerns.

## 2025-07-15 NOTE — ASSESSMENT & PLAN NOTE
Not well managed.    Patient was referred to sleep medicine with Dr. De La Rosa for evaluation and was not able to complete testing due to scheduling issues. She was encouraged to follow up with sleep medicine for a follow up.    Sleep hygiene was discussed.

## 2025-07-15 NOTE — ASSESSMENT & PLAN NOTE
Patient reports feeling tired all the time and experiences sleepiness during the day.    His sleep study will be conducted to determine if if sleep disorders are contributing to her fatigue.    Patient is to reach out to sleep medicine for a follow-up.    She was advised to remain active.  Adopt healthy lifestyles which include nutrition and exercise.    If her symptoms persist, she is to reach out to her primary care provider for evaluation and management.

## 2025-07-15 NOTE — ASSESSMENT & PLAN NOTE
Not well controlled    Her migraines really exacerbated by poor sleep quality and fluctuating blood sugar levels.      Sleep study will be conducted to further investigate potential contributing factors.      We will not make any changes on her medication at this time.  Patient is to continue Emgality 120 mg monthly, amitriptyline 50 mg nightly, gabapentin and Fioricet as needed.    Patient was advised to limit BC powder use to no more than 2 to 3 days a week to prevent rebound headaches.  She verbalized understanding.      We discussed the importance of a proper diet including plenty of fruits and vegetables as this can help with headache prevention.    We discussed the importance of staying hydrated and drinking at least 32 to 64 ounces of water daily as this can be a cause of tension type headaches.    We also discussed the importance of keeping a headache journal or log to identify triggers.    We discussed the importance of sleep hygiene and attempting to get at least 6 to 8 hours of sleep daily to help with headache prevention.

## 2025-07-15 NOTE — ASSESSMENT & PLAN NOTE
Patient reports body aches, which may be related to her cholesterol medication.    This has been monitored by her primary care provider.  No changes needed in plan of care.    If her symptoms persist, she may consider alternative medications if necessary.    She is to continue to follow-up with her primary care provider as appropriate.

## 2025-08-12 ENCOUNTER — TELEPHONE (OUTPATIENT)
Age: 55
End: 2025-08-12

## 2025-08-27 ENCOUNTER — TELEPHONE (OUTPATIENT)
Age: 55
End: 2025-08-27

## 2025-09-03 ENCOUNTER — TELEPHONE (OUTPATIENT)
Age: 55
End: 2025-09-03